# Patient Record
Sex: FEMALE | Race: WHITE | Employment: FULL TIME | URBAN - METROPOLITAN AREA
[De-identification: names, ages, dates, MRNs, and addresses within clinical notes are randomized per-mention and may not be internally consistent; named-entity substitution may affect disease eponyms.]

---

## 2020-02-17 ENCOUNTER — OFFICE VISIT (OUTPATIENT)
Dept: INTERNAL MEDICINE CLINIC | Age: 35
End: 2020-02-17

## 2020-02-17 VITALS
TEMPERATURE: 97.8 F | BODY MASS INDEX: 22.5 KG/M2 | HEIGHT: 66 IN | WEIGHT: 140 LBS | OXYGEN SATURATION: 99 % | RESPIRATION RATE: 18 BRPM | DIASTOLIC BLOOD PRESSURE: 80 MMHG | HEART RATE: 72 BPM | SYSTOLIC BLOOD PRESSURE: 133 MMHG

## 2020-02-17 DIAGNOSIS — E03.9 ACQUIRED HYPOTHYROIDISM: ICD-10-CM

## 2020-02-17 DIAGNOSIS — Z00.8 ENCOUNTER FOR BIOMETRIC SCREENING: Primary | ICD-10-CM

## 2020-02-17 DIAGNOSIS — Z00.00 ENCOUNTER FOR MEDICAL EXAMINATION TO ESTABLISH CARE: ICD-10-CM

## 2020-02-17 RX ORDER — SUMATRIPTAN 100 MG/1
TABLET, FILM COATED ORAL
COMMUNITY
Start: 2020-01-18 | End: 2020-03-18 | Stop reason: SDUPTHER

## 2020-02-17 NOTE — PROGRESS NOTES
Ms. Liseth Duarte is a new patient who is here to establish care. CC:  Establish Care       HPI:    37 yo woman presenting to establish care. Runs, climbing gym and high interval training class. 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)     No kids     Mirena for birth control / no gynecologist     Review of systems:  Constitutional: negative for fever, chills, weight loss, night sweats   Eyes : negative for vision changes, eye pain and discharge  Nose and Throat: negative for tinnitus, sore throat   Cardiovascular: negative for chest pain, palpitations and shortness of breath  Respiratory: negative for shortness of breath, cough and wheezing   Gastroinstestinal: negative for abdominal pain, nausea, vomiting, diarrhea, constipation, and blood in the stool  Musculoskeletal: negative for back ache and joint ache   Genitourinary: negative for dysuria, nocturia, polyuria and hematuria   Neurologic: Negative for focal weakness, numbness or incoordination  Skin: negative for rash, pruritus  Hematologic: negative for easy bruising      Past Medical History:   Diagnosis Date    Beta thalassemia minor     Gilbert syndrome     Thyroid disease         Past Surgical History:   Procedure Laterality Date    HX BREAST AUGMENTATION  5/13/2013    BREAST AUGMENTATION performed by Cruz Barillas MD at [address] HX TONSILLECTOMY         No Known Allergies    Current Outpatient Medications on File Prior to Visit   Medication Sig Dispense Refill    SUMAtriptan (IMITREX) 100 mg tablet       levothyroxine (SYNTHROID) 50 mcg tablet Take 50 mcg by mouth Daily (before breakfast). No current facility-administered medications on file prior to visit.         family history includes Cancer in her father; Diabetes in her mother. Social History     Socioeconomic History    Marital status:      Spouse name: Not on file    Number of children: Not on file    Years of education: Not on file    Highest education level: Not on file   Occupational History    Not on file   Social Needs    Financial resource strain: Not on file    Food insecurity:     Worry: Not on file     Inability: Not on file    Transportation needs:     Medical: Not on file     Non-medical: Not on file   Tobacco Use    Smoking status: Never Smoker    Smokeless tobacco: Never Used   Substance and Sexual Activity    Alcohol use: Yes     Comment: occassionally    Drug use: No    Sexual activity: Yes     Birth control/protection: I.U.D. Lifestyle    Physical activity:     Days per week: Not on file     Minutes per session: Not on file    Stress: Not on file   Relationships    Social connections:     Talks on phone: Not on file     Gets together: Not on file     Attends Restoration service: Not on file     Active member of club or organization: Not on file     Attends meetings of clubs or organizations: Not on file     Relationship status: Not on file    Intimate partner violence:     Fear of current or ex partner: Not on file     Emotionally abused: Not on file     Physically abused: Not on file     Forced sexual activity: Not on file   Other Topics Concern    Not on file   Social History Narrative    Not on file       Visit Vitals  /80 (BP 1 Location: Right arm, BP Patient Position: Sitting)   Pulse 72   Temp 97.8 °F (36.6 °C) (Oral)   Resp 18   Ht 5' 6\" (1.676 m)   Wt 140 lb (63.5 kg)   SpO2 99%   BMI 22.60 kg/m²     General:  Well appearing female no acute distress  HEENT:   PERRL,normal conjunctiva. External ear and canals normal, TMs normal.  Hearing normal to voice. Nose without edema or discharge, normal septum. Lips, teeth, gums normal.  Oropharynx: no erythema, no exudates, no lesions, normal tongue. Neck:  Supple.  Thyroid normal size, nontender, without nodules. No carotid bruit. No masses or lymphadenopathy  Respiratory: no respiratory distress,  no wheezing, no rhonchi, no rales. No chest wall tenderness. Cardiovascular:  RRR, normal S1S2, no murmur. Gastrointestinal: normal bowel sounds, soft, nontender, without masses. No hepatosplenomegaly. Extremities +2 pulses, no edema, normal sensation   Musculoskeletal:  Normal gait. Normal digits and nails. Normal strength and tone, no atrophy, and no abnormal movement. Skin:  No rash, no lesions, no ulcers. Skin warm, normal turgor, without induration or nodules. Neuro:  A and OX4, fluent speech, cranial nerves normal 2-12. Sensation normal to light touch. DTR symmetrical  Psych:  Normal affect            Assessment and Plan:     37 yo woman with a hx of [place] and thalassemia minor and hypothyroidism presenting to establish care    1. Encounter for medical examination to establish care    2. Encounter for biometric screening  - TSH AND FREE T4  - METABOLIC PANEL, COMPREHENSIVE  - CBC WITH AUTOMATED DIFF  - LIPID PANEL  - NICOTINE/COTININE, UR, QT    3. Acquired hypothyroidism  - TSH AND FREE T4  - METABOLIC PANEL, COMPREHENSIVE  - CBC WITH AUTOMATED DIFF    Biometric screen done also   Follow-up and Dispositions    · Return in about 6 months (around 8/17/2020) for hypothyroidism.           Allan Rodriguez MD

## 2020-02-17 NOTE — PATIENT INSTRUCTIONS
[facility] Ros[facility] at [place] calls/patient messages:            Please allow up to 24 hours for someone in the office to contact you about your call or message. Be mindful your provider may be out of the office or your message may require further review. We encourage you to use [employer] for your messages as this is a faster, more efficient way to communicate with our office                         Medication Refills:            Prescription medications require 48-72 business hours to process. We encourage you to use [employer] for your refills. For controlled medications: Please allow 72 business hours to process. Certain medications may require you to  a written prescription at our office. NO narcotic/controlled medications will be prescribed after 4pm Monday through Friday or on weekends              Form/Paperwork Completion:            Please note a $25 fee may incur for all paperwork for completed by our providers. We ask that you allow 7-10 business days. Pre-payment is due prior to picking up/faxing the completed form. You may also download your forms to [employer] to have your doctor print off.

## 2020-02-22 LAB
ALBUMIN SERPL-MCNC: 4.9 G/DL (ref 3.8–4.8)
ALBUMIN/GLOB SERPL: 2.6 {RATIO} (ref 1.2–2.2)
ALP SERPL-CCNC: 37 IU/L (ref 39–117)
ALT SERPL-CCNC: 14 IU/L (ref 0–32)
AST SERPL-CCNC: 17 IU/L (ref 0–40)
BASOPHILS # BLD AUTO: 0.1 X10E3/UL (ref 0–0.2)
BASOPHILS NFR BLD AUTO: 2 %
BILIRUB SERPL-MCNC: 3.6 MG/DL (ref 0–1.2)
BUN SERPL-MCNC: 9 MG/DL (ref 6–20)
BUN/CREAT SERPL: 12 (ref 9–23)
CALCIUM SERPL-MCNC: 9.6 MG/DL (ref 8.7–10.2)
CHLORIDE SERPL-SCNC: 104 MMOL/L (ref 96–106)
CHOLEST SERPL-MCNC: 122 MG/DL (ref 100–199)
CO2 SERPL-SCNC: 22 MMOL/L (ref 20–29)
COTININE UR QL SCN: NEGATIVE NG/ML
CREAT SERPL-MCNC: 0.78 MG/DL (ref 0.57–1)
DRUG SCREEN COMMENT:, 753798: NORMAL
EOSINOPHIL # BLD AUTO: 0.1 X10E3/UL (ref 0–0.4)
EOSINOPHIL NFR BLD AUTO: 2 %
ERYTHROCYTE [DISTWIDTH] IN BLOOD BY AUTOMATED COUNT: 18.4 % (ref 11.7–15.4)
GLOBULIN SER CALC-MCNC: 1.9 G/DL (ref 1.5–4.5)
GLUCOSE SERPL-MCNC: 81 MG/DL (ref 65–99)
HCT VFR BLD AUTO: 33.2 % (ref 34–46.6)
HDLC SERPL-MCNC: 50 MG/DL
HGB BLD-MCNC: 10.3 G/DL (ref 11.1–15.9)
IMM GRANULOCYTES # BLD AUTO: 0 X10E3/UL (ref 0–0.1)
IMM GRANULOCYTES NFR BLD AUTO: 0 %
LDLC SERPL CALC-MCNC: 62 MG/DL (ref 0–99)
LYMPHOCYTES # BLD AUTO: 3.4 X10E3/UL (ref 0.7–3.1)
LYMPHOCYTES NFR BLD AUTO: 53 %
MCH RBC QN AUTO: 20.2 PG (ref 26.6–33)
MCHC RBC AUTO-ENTMCNC: 31 G/DL (ref 31.5–35.7)
MCV RBC AUTO: 65 FL (ref 79–97)
MONOCYTES # BLD AUTO: 0.5 X10E3/UL (ref 0.1–0.9)
MONOCYTES NFR BLD AUTO: 7 %
NEUTROPHILS # BLD AUTO: 2.4 X10E3/UL (ref 1.4–7)
NEUTROPHILS NFR BLD AUTO: 36 %
PLATELET # BLD AUTO: 334 X10E3/UL (ref 150–450)
POTASSIUM SERPL-SCNC: 4.3 MMOL/L (ref 3.5–5.2)
PROT SERPL-MCNC: 6.8 G/DL (ref 6–8.5)
RBC # BLD AUTO: 5.11 X10E6/UL (ref 3.77–5.28)
SODIUM SERPL-SCNC: 140 MMOL/L (ref 134–144)
T4 FREE SERPL-MCNC: 1.63 NG/DL (ref 0.82–1.77)
TRIGL SERPL-MCNC: 48 MG/DL (ref 0–149)
TSH SERPL DL<=0.005 MIU/L-ACNC: 0.94 UIU/ML (ref 0.45–4.5)
VLDLC SERPL CALC-MCNC: 10 MG/DL (ref 5–40)
WBC # BLD AUTO: 6.5 X10E3/UL (ref 3.4–10.8)

## 2020-03-03 NOTE — PROGRESS NOTES
Testing for tobacco is negative  Thyroid function is at goal   Kidney function and liver function test is normal  Mild anemia - hemoglobin is 10.3 consistent with hx of thalassemia minor  Normal cholesterol

## 2020-03-18 RX ORDER — LEVOTHYROXINE SODIUM 50 UG/1
50 TABLET ORAL
Qty: 90 TAB | Refills: 1 | Status: SHIPPED | OUTPATIENT
Start: 2020-03-18 | End: 2020-05-13 | Stop reason: SDUPTHER

## 2020-03-18 RX ORDER — SUMATRIPTAN 100 MG/1
100 TABLET, FILM COATED ORAL
Qty: 90 TAB | Refills: 1 | Status: SHIPPED | OUTPATIENT
Start: 2020-03-18 | End: 2020-03-18

## 2020-03-18 NOTE — TELEPHONE ENCOUNTER
----- Message from Clent Form sent at 3/18/2020  5:51 AM EDT -----  Regarding: Prescription Question  Contact: [phone]  Good morning,  I would like to request a refill of my medications (levothyroxine and sumatriptan). This is the first time that Dr. Mumtaz Lira is prescribing them, so I can't request it through the normal refill process on [facility]. [facility] [address] [facility] in [facility]. Thanks!   Eufemia Venegas

## 2020-03-18 NOTE — TELEPHONE ENCOUNTER
PCP: Narayan Doshi MD    Last appt: 2/17/2020  Future Appointments   Date Time Provider Mili Nunez   8/17/2020  2:45 PM Appa MD Lily Zelaya 88       Requested Prescriptions     Pending Prescriptions Disp Refills    levothyroxine (synthroid) 50 mcg tablet 90 Tab 1     Sig: Take 1 Tab by mouth Daily (before breakfast).  SUMAtriptan (IMITREX) 100 mg tablet 90 Tab 1     Sig: Take 1 Tab by mouth once as needed for Migraine for up to 1 dose.

## 2020-04-07 ENCOUNTER — E-VISIT (OUTPATIENT)
Dept: INTERNAL MEDICINE CLINIC | Age: 35
End: 2020-04-07

## 2020-04-16 ENCOUNTER — VIRTUAL VISIT (OUTPATIENT)
Dept: INTERNAL MEDICINE CLINIC | Age: 35
End: 2020-04-16

## 2020-04-16 DIAGNOSIS — L21.9 SEBORRHEIC DERMATITIS OF SCALP: Primary | ICD-10-CM

## 2020-04-16 RX ORDER — SUMATRIPTAN 100 MG/1
TABLET, FILM COATED ORAL
COMMUNITY
Start: 2020-03-21 | End: 2020-05-13 | Stop reason: SDUPTHER

## 2020-04-16 RX ORDER — KETOCONAZOLE 20 MG/ML
10 SHAMPOO TOPICAL
Qty: 1 BOTTLE | Refills: 0 | Status: SHIPPED | OUTPATIENT
Start: 2020-04-17 | End: 2020-04-25

## 2020-04-16 NOTE — PROGRESS NOTES
Reviewed record in preparation for visit and have obtained necessary documentation. Identified pt with two pt identifiers(name and ). Chief Complaint   Patient presents with    Rash       Health Maintenance Due   Topic Date Due    Pap Test  2007    DTaP/Tdap/Td  (2 - Td) 05/15/2019       Ms. Radha Pelletier has a reminder for a \"due or due soon\" health maintenance. I have asked that she discuss this further with her primary care provider for follow-up on this health maintenance. Coordination of Care Questionnaire:  :     1) Have you been to an emergency room, urgent care clinic since your last visit? no   Hospitalized since your last visit? no             2) Have you seen or consulted any other health care providers outside of [address] [place] [address] since your last visit? no  (Include any pap smears or colon screenings in this section.)    3) In the event something were to happen to you and you were unable to speak on your behalf, do you have an Advance Directive/ Living Will in place stating your wishes? NO    Do you have an Advance Directive on file? no    4) Are you interested in receiving information on Advance Directives? NO    Patient is accompanied by self I have received verbal consent from Romana Torres to discuss any/all medical information while they are present in the room.

## 2020-04-16 NOTE — PROGRESS NOTES
CC: Rash      HPI:      35 yo woman with a hx of [place] and thalassemia minor and hypothyroidism presenting to discuss rash on scalp   Onset  Few weeks ago  It causes some itching   At times notes flakes  No lice seen  No redness  No  Hair loss  No fever or chills     This is an established visit conducted via telemedicine with video. The patient has been instructed that this meets HIPAA criteria and acknowledges and agrees to this method of visitation. Pursuant to the emergency declaration under the [address] [place] [address] [facility] authority and the [employer] Act, this Virtual Visit was conducted, with patient's consent, to reduce the patient's risk of exposure to COVID-19 and provide continuity of care for an established patient. Services were provided through a video synchronous discussion virtually to substitute for in-person clinic visit. ROS:  Constitutional: negative for fevers, chills, anorexia and weight loss  Eyes:   negative for visual disturbance,  irritation  ENT:   negative for tinnitus,sore throat,nasal congestion,ear pain, sinus pain. Respiratory:  negative for cough, hemoptysis, dyspnea,wheezing  CV:   negative for chest pain, palpitations, lower extremity edema  GI:   negative for nausea, vomiting, diarrhea, abdominal pain,melena  Genitourinary: negative for frequency, dysuria, hematuria  Musculoskel: negative for myalgias, arthralgias, back pain, muscle weakness, joint pain  Neurological:  negative for headaches, dizziness, focal weakness, numbness  Psych:             Negative for depression and anxiety    Past Medical History:   Diagnosis Date    Beta thalassemia minor     Gilbert syndrome     Thyroid disease        Current Outpatient Medications on File Prior to Visit   Medication Sig Dispense Refill    levothyroxine (synthroid) 50 mcg tablet Take 1 Tab by mouth Daily (before breakfast).  80 Tab 1    SUMAtriptan (IMITREX) 100 mg tablet        No current facility-administered medications on file prior to visit. Past Surgical History:   Procedure Laterality Date    HX BREAST AUGMENTATION  5/13/2013    BREAST [facility]UG[facility]    HX TONSILLECTOMY         Family History   Problem Relation Age of Onset    Diabetes Mother     Cancer Father      Reviewed and no changes     Social History     Socioeconomic History    Marital status:      Spouse name: Not on file    Number of children: Not on file    Years of education: Not on file    Highest education level: Not on file   Occupational History    Not on file   Social Needs    Financial resource strain: Not on file    Food insecurity     Worry: Not on file     Inability: Not on file    Transportation needs     Medical: Not on file     Non-medical: Not on file   Tobacco Use    Smoking status: Never Smoker    Smokeless tobacco: Never Used   Substance and Sexual Activity    Alcohol use: Yes     Comment: occassionally    Drug use: No    Sexual activity: Yes     Birth control/protection: I.U.D. Lifestyle    Physical activity     Days per week: Not on file     Minutes per session: Not on file    Stress: Not on file   Relationships    Social connections     Talks on phone: Not on file     Gets together: Not on file     Attends Episcopal service: Not on file     Active member of club or organization: Not on file     Attends meetings of clubs or organizations: Not on file     Relationship status: Not on file    Intimate partner violence     Fear of current or ex partner: Not on file     Emotionally abused: Not on file     Physically abused: Not on file     Forced sexual activity: Not on file   Other Topics Concern    Not on file   Social History Narrative    Not on file          There were no vitals taken for this visit.     Physical Examination:   Gen: well appearing female  HEENT: normal conjunctiva,   Head with assistance of  visualized scalp there is an area with flakiness with a yellow white base ( around 2 cm)   Neck: patient does not feel enlarged or tender LAD or masses  Resp: normal respiratory effort, no audible wheezing. CV: patient does not feel palpitations or heart irregularity  Abd: patient does not feel abdominal tenderness or mass, patient does not notice distension  Extrem: patient does not see swelling in ankles or joints. Neuro: Alert and oriented, able to answer questions without difficulty, able to move all extremities and walk normally          Lab Results   Component Value Date/Time    WBC 6.5 02/21/2020 08:59 AM    HGB 10.3 (L) 02/21/2020 08:59 AM    HCT 33.2 (L) 02/21/2020 08:59 AM    PLATELET 674 17/38/4068 08:59 AM    MCV 65 (L) 02/21/2020 08:59 AM     Lab Results   Component Value Date/Time    Sodium 140 02/21/2020 08:59 AM    Potassium 4.3 02/21/2020 08:59 AM    Chloride 104 02/21/2020 08:59 AM    CO2 22 02/21/2020 08:59 AM    Glucose 81 02/21/2020 08:59 AM    BUN 9 02/21/2020 08:59 AM    Creatinine 0.78 02/21/2020 08:59 AM    BUN/Creatinine ratio 12 02/21/2020 08:59 AM    GFR est  02/21/2020 08:59 AM    GFR est non- 02/21/2020 08:59 AM    Calcium 9.6 02/21/2020 08:59 AM     Lab Results   Component Value Date/Time    Cholesterol, total 122 02/21/2020 08:59 AM    HDL Cholesterol 50 02/21/2020 08:59 AM    LDL, calculated 62 02/21/2020 08:59 AM    VLDL, calculated 10 02/21/2020 08:59 AM    Triglyceride 48 02/21/2020 08:59 AM     Lab Results   Component Value Date/Time    TSH 0.940 02/21/2020 08:59 AM     No results found for: PSA, PSA2, PSAR1, PSA1, PSAR2, PSA3, PSAR3, [id number], [id number], PSALT  No results found for: HBA1C, HGBE8, QTJ3ERRI, CXV5WVGV, VZS3RSFB  No results found for: Cheryln Stalls, VD3RIA    Lab Results   Component Value Date/Time    ALT (SGPT) 14 02/21/2020 08:59 AM    AST (SGOT) 17 02/21/2020 08:59 AM    Alk. phosphatase 37 (L) 02/21/2020 08:59 AM    Bilirubin, total 3.6 (H) 02/21/2020 08:59 AM           Assessment/Plan:    1. Seborrheic dermatitis of scalp  Use shampoo twice a week for 3-4 weeks, leave on for 3 minutes prior to rinsing  Call if symptoms do not improve  - ketoconazole (NIZORAL) 2 % shampoo; Apply 10 mL to affected area every Monday and Friday for 8 days. Dispense: 1 Bottle; Refill: Jessica Loera MD    This is an established visit conducted via real time video and audio telemedicine. The patient has been instructed that this meets HIPAA criteria and acknowledges and agrees to this method of visitation.

## 2020-05-13 RX ORDER — LEVOTHYROXINE SODIUM 50 UG/1
50 TABLET ORAL
Qty: 90 TAB | Refills: 1 | Status: SHIPPED | OUTPATIENT
Start: 2020-05-13 | End: 2020-12-22 | Stop reason: SDUPTHER

## 2020-05-13 RX ORDER — SUMATRIPTAN 100 MG/1
100 TABLET, FILM COATED ORAL
Qty: 9 TAB | Refills: 1 | Status: SHIPPED | OUTPATIENT
Start: 2020-05-13 | End: 2020-05-13

## 2020-05-13 NOTE — TELEPHONE ENCOUNTER
Patient is requesting a 90 day supply of the following pended medication(s) to be sent to [address] [place] [address]. It is now the patient's preferred pharmacy for all maintenance medications due to cost effectiveness.      [address] [facility] [address] [facility]  Ant Castro, [facility]  Phone: [phone]

## 2020-08-17 ENCOUNTER — VIRTUAL VISIT (OUTPATIENT)
Dept: INTERNAL MEDICINE CLINIC | Age: 35
End: 2020-08-17
Payer: COMMERCIAL

## 2020-08-17 DIAGNOSIS — G43.909 MIGRAINE WITHOUT STATUS MIGRAINOSUS, NOT INTRACTABLE, UNSPECIFIED MIGRAINE TYPE: ICD-10-CM

## 2020-08-17 DIAGNOSIS — E03.9 ACQUIRED HYPOTHYROIDISM: Primary | ICD-10-CM

## 2020-08-17 PROCEDURE — 99213 OFFICE O/P EST LOW 20 MIN: CPT | Performed by: INTERNAL MEDICINE

## 2020-08-17 RX ORDER — SUMATRIPTAN 100 MG/1
TABLET, FILM COATED ORAL
COMMUNITY
Start: 2020-08-04 | End: 2021-06-02

## 2020-08-17 NOTE — PROGRESS NOTES
Reviewed record in preparation for visit and have obtained necessary documentation. Identified pt with two pt identifiers(name and ). Chief Complaint   Patient presents with    Hypothyroidism       Health Maintenance Due   Topic Date Due    Pap Test  2007    DTaP/Tdap/Td  (2 - Td) 05/15/2019    Flu Vaccine  2020       Ms. Luba Hashimoto has a reminder for a \"due or due soon\" health maintenance. I have asked that she discuss this further with her primary care provider for follow-up on this health maintenance. Coordination of Care Questionnaire:  :     1) Have you been to an emergency room, urgent care clinic since your last visit? no   Hospitalized since your last visit? no             2) Have you seen or consulted any other health care providers outside of [facility] since your last visit? no  (Include any pap smears or colon screenings in this section.)    3) In the event something were to happen to you and you were unable to speak on your behalf, do you have an Advance Directive/ Living Will in place stating your wishes? NO    Do you have an Advance Directive on file? no    4) Are you interested in receiving information on Advance Directives? NO    Patient is accompanied by self I have received verbal consent from Sunny Arias to discuss any/all medical information while they are present in the room.

## 2020-08-17 NOTE — PROGRESS NOTES
CC: Hypothyroidism      HPI:    She is a 36 y.o. female who presents for evaluation of       35 yo woman with a hx of [place] and thalassemia minor and hypothyroidism presenting to follow up       Hypothyroidism - on levothyroxine 50 mcg daily / denies hair loss brittle nails   Energy level is good    Migraines - varies in intensity, imitrex works well as abortive therapy     This is an established visit conducted via telemedicine with video. The patient has been instructed that this meets HIPAA criteria and acknowledges and agrees to this method of visitation. Pursuant to the emergency declaration under the [facility], [facility] waiver authority and the [employer] Act, this Virtual Visit was conducted, with patient's consent, to reduce the patient's risk of exposure to COVID-19 and provide continuity of care for an established patient. Services were provided through a video synchronous discussion virtually to substitute for in-person clinic visit. ROS:  Constitutional: negative for fevers, chills, anorexia and weight loss  Eyes:   negative for visual disturbance,  irritation  ENT:   negative for tinnitus,sore throat,nasal congestion,ear pain, sinus pain.    Respiratory:  negative for cough, hemoptysis, dyspnea,wheezing  CV:   negative for chest pain, palpitations, lower extremity edema  GI:   negative for nausea, vomiting, diarrhea, abdominal pain,melena  Genitourinary: negative for frequency, dysuria, hematuria  Musculoskel: negative for myalgias, arthralgias, back pain, muscle weakness, joint pain  Neurological:  negative for headaches, dizziness, focal weakness, numbness  Psych:             Negative for depression and anxiety    Past Medical History:   Diagnosis Date    Beta thalassemia minor     Gilbert syndrome     Thyroid disease        Current Outpatient Medications on File Prior to Visit   Medication Sig Dispense Refill    levothyroxine (synthroid) 50 mcg tablet Take 1 Tab by mouth Daily (before breakfast). 90 Tab 1    SUMAtriptan (IMITREX) 100 mg tablet        No current facility-administered medications on file prior to visit. Past Surgical History:   Procedure Laterality Date    HX BREAST AUGMENTATION  5/13/2013    BREAST AUGMENTATION performed by Jobie Riedel, MD at [facility]    HX TONSILLECTOMY         Family History   Problem Relation Age of Onset    Diabetes Mother     Cancer Father      Reviewed and no changes     Social History     Socioeconomic History    Marital status:      Spouse name: Not on file    Number of children: Not on file    Years of education: Not on file    Highest education level: Not on file   Occupational History    Not on file   Social Needs    Financial resource strain: Not on file    Food insecurity     Worry: Not on file     Inability: Not on file    Transportation needs     Medical: Not on file     Non-medical: Not on file   Tobacco Use    Smoking status: Never Smoker    Smokeless tobacco: Never Used   Substance and Sexual Activity    Alcohol use: Yes     Comment: occassionally    Drug use: No    Sexual activity: Yes     Birth control/protection: I.U.D.    Lifestyle    Physical activity     Days per week: Not on file     Minutes per session: Not on file    Stress: Not on file   Relationships    Social connections     Talks on phone: Not on file     Gets together: Not on file     Attends Synagogue service: Not on file     Active member of club or organization: Not on file     Attends meetings of clubs or organizations: Not on file     Relationship status: Not on file    Intimate partner violence     Fear of current or ex partner: Not on file     Emotionally abused: Not on file     Physically abused: Not on file     Forced sexual activity: Not on file   Other Topics Concern    Not on file   Social History Narrative    Not on file There were no vitals taken for this visit. Physical Examination:   Gen: well appearing female  HEENT: normal conjunctiva, no audible congestion, patient does not see oral erythema, has MMM  Neck: patient does not feel enlarged or tender LAD or masses  Resp: normal respiratory effort, no audible wheezing. CV: patient does not feel palpitations or heart irregularity  Abd: patient does not feel abdominal tenderness or mass, patient does not notice distension  Extrem: patient does not see swelling in ankles or joints.    Neuro: Alert and oriented, able to answer questions without difficulty, able to move all extremities and walk normally          Lab Results   Component Value Date/Time    WBC 6.5 02/21/2020 08:59 AM    HGB 10.3 (L) 02/21/2020 08:59 AM    HCT 33.2 (L) 02/21/2020 08:59 AM    PLATELET 686 23/95/7609 08:59 AM    MCV 65 (L) 02/21/2020 08:59 AM     Lab Results   Component Value Date/Time    Sodium 140 02/21/2020 08:59 AM    Potassium 4.3 02/21/2020 08:59 AM    Chloride 104 02/21/2020 08:59 AM    CO2 22 02/21/2020 08:59 AM    Glucose 81 02/21/2020 08:59 AM    BUN 9 02/21/2020 08:59 AM    Creatinine 0.78 02/21/2020 08:59 AM    BUN/Creatinine ratio 12 02/21/2020 08:59 AM    GFR est  02/21/2020 08:59 AM    GFR est non- 02/21/2020 08:59 AM    Calcium 9.6 02/21/2020 08:59 AM     Lab Results   Component Value Date/Time    Cholesterol, total 122 02/21/2020 08:59 AM    HDL Cholesterol 50 02/21/2020 08:59 AM    LDL, calculated 62 02/21/2020 08:59 AM    VLDL, calculated 10 02/21/2020 08:59 AM    Triglyceride 48 02/21/2020 08:59 AM     Lab Results   Component Value Date/Time    TSH 0.940 02/21/2020 08:59 AM     No results found for: PSA, PSA2, PSAR1, PSA1, PSAR2, PSA3, PSAR3, [id number], [id number], PSALT  No results found for: HBA1C, HGBE8, IAW9YELS, GQO9EOWZ, FDS7HNIF  No results found for: Art Elisha, VD3RIA    Lab Results   Component Value Date/Time    ALT (SGPT) 14 02/21/2020 08:59 AM    Alk. phosphatase 37 (L) 02/21/2020 08:59 AM    Bilirubin, total 3.6 (H) 02/21/2020 08:59 AM           Assessment/Plan:    1. Acquired hypothyroidism  - euthyroid on exam- on levothyroxine 50 mcg daily   - TSH AND FREE T4    2. Migraine without status migrainosus, not intractable, unspecified migraine type  imitrex works as as abortive therapy       3. Thalassemia minor: stable mild anemia      Follow-up and Dispositions    · Return in about 6 months (around 2/17/2021) for thyroid . Kristin Hodges MD    This is an established visit conducted via real time video and audio telemedicine. The patient has been instructed that this meets HIPAA criteria and acknowledges and agrees to this method of visitation.

## 2020-09-04 LAB
T4 FREE SERPL-MCNC: 1.32 NG/DL (ref 0.82–1.77)
TSH SERPL DL<=0.005 MIU/L-ACNC: 0.79 UIU/ML (ref 0.45–4.5)

## 2020-12-22 RX ORDER — LEVOTHYROXINE SODIUM 50 UG/1
50 TABLET ORAL
Qty: 90 TAB | Refills: 1 | Status: SHIPPED | OUTPATIENT
Start: 2020-12-22 | End: 2021-06-21

## 2021-06-02 RX ORDER — SUMATRIPTAN 100 MG/1
TABLET, FILM COATED ORAL
Qty: 9 TABLET | Refills: 1 | Status: SHIPPED | OUTPATIENT
Start: 2021-06-02 | End: 2021-06-03

## 2021-06-03 ENCOUNTER — TELEPHONE (OUTPATIENT)
Dept: PHARMACY | Age: 36
End: 2021-06-03

## 2021-06-03 RX ORDER — SUMATRIPTAN 100 MG/1
TABLET, FILM COATED ORAL
Qty: 9 TABLET | Refills: 1 | Status: SHIPPED | OUTPATIENT
Start: 2021-06-03 | End: 2021-06-04 | Stop reason: SDUPTHER

## 2021-06-03 NOTE — TELEPHONE ENCOUNTER
We have been requesting a refill for the Sumatriptan tablets. It looks to be approved but the script shows printed. I have not received any fax. Can you please resend electronically or fax to [phone]? You can also call it in at [phone]  Please call asap with any questions as well.     Thank you,  [address] Delivery

## 2021-06-04 RX ORDER — SUMATRIPTAN 100 MG/1
TABLET, FILM COATED ORAL
Qty: 9 TABLET | Refills: 1 | Status: SHIPPED | OUTPATIENT
Start: 2021-06-04 | End: 2021-06-07 | Stop reason: SDUPTHER

## 2021-06-07 RX ORDER — SUMATRIPTAN 100 MG/1
TABLET, FILM COATED ORAL
Qty: 9 TABLET | Refills: 1 | Status: SHIPPED | OUTPATIENT
Start: 2021-06-07 | End: 2021-07-22 | Stop reason: ALTCHOICE

## 2021-06-07 NOTE — TELEPHONE ENCOUNTER
[facility] Delivery called and has not gotten the script from 6-4-21 that looks like it went, but looking further it did not go through. This is for the Sumatriptan     Can this be sent to them as soon as possible? Thanks.

## 2021-06-21 ENCOUNTER — OFFICE VISIT (OUTPATIENT)
Dept: SURGERY | Age: 36
End: 2021-06-21
Payer: COMMERCIAL

## 2021-06-21 VITALS
DIASTOLIC BLOOD PRESSURE: 82 MMHG | BODY MASS INDEX: 22.18 KG/M2 | WEIGHT: 138 LBS | HEIGHT: 66 IN | RESPIRATION RATE: 16 BRPM | TEMPERATURE: 99.2 F | HEART RATE: 67 BPM | OXYGEN SATURATION: 99 % | SYSTOLIC BLOOD PRESSURE: 120 MMHG

## 2021-06-21 DIAGNOSIS — C43.4 MELANOMA OF NECK (HCC): Primary | ICD-10-CM

## 2021-06-21 PROCEDURE — 99202 OFFICE O/P NEW SF 15 MIN: CPT | Performed by: SURGERY

## 2021-06-21 NOTE — LETTER
6/21/2021 Patient: Vane Ny YOB: 1985 Date of Visit: 6/21/2021 Susan Reis MD 
[address] [facility] [address] [facility] [address] [address] In [employer] Lajean Goodell, MD 
[address] [facility] [address] [facility] [address] Via Fax: [phone] Dear Kandyce Ribas, MD Lajean Goodell, MD, Thank you for referring Ms. Vane Ny to [facility] [facility] for evaluation. My notes for this consultation are attached. If you have questions, please do not hesitate to call me. I look forward to following your patient along with you. Sincerely, Katja Veliz MD

## 2021-06-21 NOTE — H&P (VIEW-ONLY)
Subjective:      Caren Cloud  is a 30 y.o.  female referred by Dr. Aneesh Gray for evaluation of LEFT lateral neck base melanoma. Pt had shave biopsy of LEFT lateral neck base with Dr. Aneesh Gray on 05/26/21 with PATH demonstrating malignant melanoma, at least 1.94 mm thick, lesion extends to biopsy base, pT2a. Pt reports she had mole present on her LEFT neck for several years. Several months ago, she noticed area would bleed and scab over. Pt reports she is currently 4 weeks pregnant. Past Medical History:   Diagnosis Date    Beta thalassemia minor     Gilbert syndrome     Melanoma of neck ([facility] [address] 6/21/2021    Thyroid disease        Past Surgical History:   Procedure Laterality Date    HX BREAST AUGMENTATION  5/13/2013    BREAST AUGMENTATION performed by Alma Rosa Lang MD at [place] [address] HX TONSILLECTOMY         Social History     Tobacco Use    Smoking status: Never Smoker    Smokeless tobacco: Never Used   Substance Use Topics    Alcohol use: Yes     Comment: occassionally       Family History   Problem Relation Age of Onset    Diabetes Mother     Cancer Father        Current Outpatient Medications on File Prior to Visit   Medication Sig Dispense Refill    prenatal vit-iron fumarate-fa 27 mg iron- 0.8 mg tab tablet Take 1 Tablet by mouth daily.  SUMAtriptan (IMITREX) 100 mg tablet TAKE 1 TABLET BY MOUTH ONCE AS NEEDED FOR MIGRAINE FOR UP TO ONE DOSE 9 Tablet 1     No current facility-administered medications on file prior to visit.        No Known Allergies    Review of Systems:  Constitutional: No fever or chills  Neurologic: No headache  Eyes: No scleral icterus or irritated eyes  Nose: No nasal pain or drainage  Mouth: No oral lesions or sore throat  Cardiac: No palpations or chest pain  Pulmonary: No cough or shortness or breath  Gastrointestinal: No nausea, emesis, diarrhea, or constipation  Genitourinary: No dysuria  Musculoskeletal: No muscle or joint tenderness  Skin: No rashes or lesions  Psychiatric: No anxiety or depressed mood    Objective:     Visit Vitals  /82 (BP 1 Location: Left upper arm, BP Patient Position: Sitting, BP Cuff Size: Adult)   Pulse 67   Temp 99.2 °F (37.3 °C) (Oral)   Resp 16   Ht 5' 6\" (1.676 m)   Wt 138 lb (62.6 kg)   SpO2 99%   BMI 22.27 kg/m²        Physical Exam:  General: No acute distress, conversant  Eyes: PERRLA, no scleral icterus  HENT: Normocephalic without oral lesions  Neck: Trachea midline without LAD  Cardiac: Normal pulse rate and rhythm  Pulmonary: Symmetric chest rise with normal effort  GI: Soft, NT, ND, no hernia, no splenomegaly  Skin: LEFT lower neck (zone of level 5) shave biopsy. Lymphatic: No cervical nor supraclavicular adenopathy. Extremities: No edema or joint stiffness  Psych: Appropriate mood and affect    Labs: No results found for this or any previous visit (from the past 24 hour(s)). Data Review: All previous imaging, testing and lab work was reviewed and interpreted. Assessment and Plan:       ICD-10-CM ICD-9-CM    1. Melanoma of neck (HCC)  C43.4 172.4        Will plan for wide excision LEFT lower neck melanoma and SLNBx. Reviewed the details of this procedure and what pt should expect for recovery. This will be an outpatient procedure and pt will need a  to take them home following surgery. All questions were answered and pt is in agreement with this plan. The patient was counseled at length about the risks of hilton Covid-19 during their perioperative period and any recovery window from their procedure. The patient was made aware that hilton Covid-19  may worsen their prognosis for recovering from their procedure and lend to a higher morbidity and/or mortality risk. All material risks, benefits, and reasonable alternatives including postponing the procedure were discussed. The patient does  wish to proceed with the procedure at this time.     Total face to face time with patient: 15 minutes. Greater than 50% of the time was spent in counseling. This document was scribed by Loraine Dominguez as dictated by Dr. Myriam Powell.      Signed By: Kameron Gonzales MD     06/21/21

## 2021-06-21 NOTE — PROGRESS NOTES
1. Have you been to the ER, urgent care clinic since your last visit? Hospitalized since your last visit? No    2. Have you seen or consulted any other health care providers outside of the [address] [place] [address] since your last visit? Include any pap smears or colon screening.  No

## 2021-06-21 NOTE — PROGRESS NOTES
Subjective:      Domenico Mckeon  is a 30 y.o.  female referred by Dr. Lee Hernandes for evaluation of LEFT lateral neck base melanoma. Pt had shave biopsy of LEFT lateral neck base with Dr. Lee Hernandes on 05/26/21 with PATH demonstrating malignant melanoma, at least 1.94 mm thick, lesion extends to biopsy base, pT2a. Pt reports she had mole present on her LEFT neck for several years. Several months ago, she noticed area would bleed and scab over. Pt reports she is currently 4 weeks pregnant. Past Medical History:   Diagnosis Date    Beta thalassemia minor     Gilbert syndrome     Melanoma of neck ([facility] [address] 6/21/2021    Thyroid disease        Past Surgical History:   Procedure Laterality Date    HX BREAST AUGMENTATION  5/13/2013    BREAST AUGMENTATION performed by Gerard Denis MD at [address] HX TONSILLECTOMY         Social History     Tobacco Use    Smoking status: Never Smoker    Smokeless tobacco: Never Used   Substance Use Topics    Alcohol use: Yes     Comment: occassionally       Family History   Problem Relation Age of Onset    Diabetes Mother     Cancer Father        Current Outpatient Medications on File Prior to Visit   Medication Sig Dispense Refill    prenatal vit-iron fumarate-fa 27 mg iron- 0.8 mg tab tablet Take 1 Tablet by mouth daily.  SUMAtriptan (IMITREX) 100 mg tablet TAKE 1 TABLET BY MOUTH ONCE AS NEEDED FOR MIGRAINE FOR UP TO ONE DOSE 9 Tablet 1     No current facility-administered medications on file prior to visit.        No Known Allergies    Review of Systems:  Constitutional: No fever or chills  Neurologic: No headache  Eyes: No scleral icterus or irritated eyes  Nose: No nasal pain or drainage  Mouth: No oral lesions or sore throat  Cardiac: No palpations or chest pain  Pulmonary: No cough or shortness or breath  Gastrointestinal: No nausea, emesis, diarrhea, or constipation  Genitourinary: No dysuria  Musculoskeletal: No muscle or joint tenderness  Skin: No rashes or lesions  Psychiatric: No anxiety or depressed mood    Objective:     Visit Vitals  /82 (BP 1 Location: Left upper arm, BP Patient Position: Sitting, BP Cuff Size: Adult)   Pulse 67   Temp 99.2 °F (37.3 °C) (Oral)   Resp 16   Ht 5' 6\" (1.676 m)   Wt 138 lb (62.6 kg)   SpO2 99%   BMI 22.27 kg/m²        Physical Exam:  General: No acute distress, conversant  Eyes: PERRLA, no scleral icterus  HENT: Normocephalic without oral lesions  Neck: Trachea midline without LAD  Cardiac: Normal pulse rate and rhythm  Pulmonary: Symmetric chest rise with normal effort  GI: Soft, NT, ND, no hernia, no splenomegaly  Skin: LEFT lower neck (zone of level 5) shave biopsy. Lymphatic: No cervical nor supraclavicular adenopathy. Extremities: No edema or joint stiffness  Psych: Appropriate mood and affect    Labs: No results found for this or any previous visit (from the past 24 hour(s)). Data Review: All previous imaging, testing and lab work was reviewed and interpreted. Assessment and Plan:       ICD-10-CM ICD-9-CM    1. Melanoma of neck (HCC)  C43.4 172.4        Will plan for wide excision LEFT lower neck melanoma and SLNBx. Reviewed the details of this procedure and what pt should expect for recovery. This will be an outpatient procedure and pt will need a  to take them home following surgery. All questions were answered and pt is in agreement with this plan. The patient was counseled at length about the risks of hilton Covid-19 during their perioperative period and any recovery window from their procedure. The patient was made aware that hilton Covid-19  may worsen their prognosis for recovering from their procedure and lend to a higher morbidity and/or mortality risk. All material risks, benefits, and reasonable alternatives including postponing the procedure were discussed. The patient does  wish to proceed with the procedure at this time.     Total face to face time with patient: 15 minutes. Greater than 50% of the time was spent in counseling. This document was scribed by Marylee Bence as dictated by Dr. Adelaida Lay.      Signed By: Ayaka Qiu MD     06/21/21

## 2021-06-24 ENCOUNTER — TRANSCRIBE ORDER (OUTPATIENT)
Dept: SCHEDULING | Age: 36
End: 2021-06-24

## 2021-06-24 DIAGNOSIS — C43.9 MELANOMA (HCC): Primary | ICD-10-CM

## 2021-06-24 DIAGNOSIS — C43.4 MELANOMA OF NECK (HCC): Primary | ICD-10-CM

## 2021-06-24 NOTE — PERIOP NOTES
CALLED TO SCHEDULE COVID TESTING APPT. PT IS FULLY VACCINATED. PFIZER 12/23/2020 & 1/13/2021. PT ADVISED TO BRING VACCINATION CARD DAY OF SURGERY.

## 2021-06-28 ENCOUNTER — TELEPHONE (OUTPATIENT)
Dept: SURGERY | Age: 36
End: 2021-06-28

## 2021-06-28 ENCOUNTER — ANESTHESIA EVENT (OUTPATIENT)
Dept: SURGERY | Age: 36
End: 2021-06-28
Payer: COMMERCIAL

## 2021-06-28 NOTE — PERIOP NOTES
Preop phone call completed. Preop instructions and preop medications reveiwed with patient. Pt instructed to Purchase 2  4 oz bottles of CHG soap and instructed in use. MD NOTIFIED THAT PATIENT IS NOW PREGNANT.

## 2021-06-28 NOTE — TELEPHONE ENCOUNTER
Told her I had communicated with Dr. Nancy South who said to go ahead, since she is a very healthy young woman. Will proceed tomorrow.

## 2021-06-28 NOTE — TELEPHONE ENCOUNTER
Dr. Arron Martin, patient interested in free prenatal vitamins and iron per response from [id number] [facility]. Order for [facility] Baby Box pending for your convenience. Thank you,  Estephania Jo. Anibal, PharmD  [address]  Department, toll free: [phone], option [address].      =======================================================================    [place] REVIEW - Be Well With Baby    SUBJECTIVE  Kenneth Box is a 30 y.o. female currently identified as interested in receiving a [facility] \"Baby Box\" containing a 1 year supply of prenatal vitamins from [facility]. Contents of Baby Box:   Welcome Letter   6 month supply of [employer]'s Blend Prenatal Multivitamin with Minerals (Take 1 softgel once daily) with 1 refill    Thank you  Rosita Barnett, PharmD  [address]  Department, toll free: [phone], option [address].

## 2021-06-29 ENCOUNTER — APPOINTMENT (OUTPATIENT)
Dept: NUCLEAR MEDICINE | Age: 36
End: 2021-06-29
Attending: SURGERY
Payer: COMMERCIAL

## 2021-06-29 ENCOUNTER — HOSPITAL ENCOUNTER (OUTPATIENT)
Age: 36
Setting detail: OUTPATIENT SURGERY
Discharge: HOME | End: 2021-06-29
Attending: SURGERY | Admitting: SURGERY
Payer: COMMERCIAL

## 2021-06-29 ENCOUNTER — ANESTHESIA (OUTPATIENT)
Dept: SURGERY | Age: 36
End: 2021-06-29
Payer: COMMERCIAL

## 2021-06-29 VITALS
HEIGHT: 66 IN | OXYGEN SATURATION: 100 % | TEMPERATURE: 98.1 F | HEART RATE: 78 BPM | SYSTOLIC BLOOD PRESSURE: 103 MMHG | WEIGHT: 138.01 LBS | BODY MASS INDEX: 22.18 KG/M2 | RESPIRATION RATE: 21 BRPM | DIASTOLIC BLOOD PRESSURE: 61 MMHG

## 2021-06-29 DIAGNOSIS — C43.9 MELANOMA (HCC): ICD-10-CM

## 2021-06-29 DIAGNOSIS — C43.4 MELANOMA OF NECK (HCC): ICD-10-CM

## 2021-06-29 PROCEDURE — 77030002933 HC SUT MCRYL J&J -A: Performed by: SURGERY

## 2021-06-29 PROCEDURE — 74011250636 HC RX REV CODE- 250/636: Performed by: ANESTHESIOLOGY

## 2021-06-29 PROCEDURE — 77030002996 HC SUT SLK J&J -A: Performed by: SURGERY

## 2021-06-29 PROCEDURE — A9520 TC99 TILMANOCEPT DIAG 0.5MCI: HCPCS

## 2021-06-29 PROCEDURE — 12042 INTMD RPR N-HF/GENIT2.6-7.5: CPT | Performed by: SURGERY

## 2021-06-29 PROCEDURE — 74011250636 HC RX REV CODE- 250/636: Performed by: NURSE ANESTHETIST, CERTIFIED REGISTERED

## 2021-06-29 PROCEDURE — 77030031139 HC SUT VCRL2 J&J -A: Performed by: SURGERY

## 2021-06-29 PROCEDURE — 2709999900 HC NON-CHARGEABLE SUPPLY: Performed by: SURGERY

## 2021-06-29 PROCEDURE — 74011000250 HC RX REV CODE- 250: Performed by: SURGERY

## 2021-06-29 PROCEDURE — 76010000138 HC OR TIME 0.5 TO 1 HR: Performed by: SURGERY

## 2021-06-29 PROCEDURE — 74011250637 HC RX REV CODE- 250/637: Performed by: ANESTHESIOLOGY

## 2021-06-29 PROCEDURE — 88305 TISSUE EXAM BY PATHOLOGIST: CPT

## 2021-06-29 PROCEDURE — 76210000020 HC REC RM PH II FIRST 0.5 HR: Performed by: SURGERY

## 2021-06-29 PROCEDURE — 74011250636 HC RX REV CODE- 250/636: Performed by: SURGERY

## 2021-06-29 PROCEDURE — 77030010507 HC ADH SKN DERMBND J&J -B: Performed by: SURGERY

## 2021-06-29 PROCEDURE — 76060000032 HC ANESTHESIA 0.5 TO 1 HR: Performed by: SURGERY

## 2021-06-29 PROCEDURE — 77030040361 HC SLV COMPR DVT MDII -B: Performed by: SURGERY

## 2021-06-29 PROCEDURE — 74011000250 HC RX REV CODE- 250: Performed by: NURSE ANESTHETIST, CERTIFIED REGISTERED

## 2021-06-29 PROCEDURE — 76210000006 HC OR PH I REC 0.5 TO 1 HR: Performed by: SURGERY

## 2021-06-29 PROCEDURE — 11626 EXC S/N/H/F/G MAL+MRG >4 CM: CPT | Performed by: SURGERY

## 2021-06-29 RX ORDER — LIDOCAINE HYDROCHLORIDE 20 MG/ML
INJECTION, SOLUTION EPIDURAL; INFILTRATION; INTRACAUDAL; PERINEURAL AS NEEDED
Status: DISCONTINUED | OUTPATIENT
Start: 2021-06-29 | End: 2021-06-29 | Stop reason: HOSPADM

## 2021-06-29 RX ORDER — MORPHINE SULFATE 2 MG/ML
2 INJECTION, SOLUTION INTRAMUSCULAR; INTRAVENOUS
Status: DISCONTINUED | OUTPATIENT
Start: 2021-06-29 | End: 2021-06-29 | Stop reason: HOSPADM

## 2021-06-29 RX ORDER — MIDAZOLAM HYDROCHLORIDE 1 MG/ML
1 INJECTION, SOLUTION INTRAMUSCULAR; INTRAVENOUS AS NEEDED
Status: DISCONTINUED | OUTPATIENT
Start: 2021-06-29 | End: 2021-06-29 | Stop reason: HOSPADM

## 2021-06-29 RX ORDER — SODIUM CHLORIDE 9 MG/ML
25 INJECTION, SOLUTION INTRAVENOUS CONTINUOUS
Status: DISCONTINUED | OUTPATIENT
Start: 2021-06-29 | End: 2021-06-29 | Stop reason: HOSPADM

## 2021-06-29 RX ORDER — FENTANYL CITRATE 50 UG/ML
25 INJECTION, SOLUTION INTRAMUSCULAR; INTRAVENOUS
Status: DISCONTINUED | OUTPATIENT
Start: 2021-06-29 | End: 2021-06-29 | Stop reason: HOSPADM

## 2021-06-29 RX ORDER — ACETAMINOPHEN 325 MG/1
650 TABLET ORAL ONCE
Status: COMPLETED | OUTPATIENT
Start: 2021-06-29 | End: 2021-06-29

## 2021-06-29 RX ORDER — SODIUM CHLORIDE, SODIUM LACTATE, POTASSIUM CHLORIDE, CALCIUM CHLORIDE 600; 310; 30; 20 MG/100ML; MG/100ML; MG/100ML; MG/100ML
100 INJECTION, SOLUTION INTRAVENOUS CONTINUOUS
Status: DISCONTINUED | OUTPATIENT
Start: 2021-06-29 | End: 2021-06-29 | Stop reason: HOSPADM

## 2021-06-29 RX ORDER — LIDOCAINE HYDROCHLORIDE 10 MG/ML
0.1 INJECTION, SOLUTION EPIDURAL; INFILTRATION; INTRACAUDAL; PERINEURAL AS NEEDED
Status: DISCONTINUED | OUTPATIENT
Start: 2021-06-29 | End: 2021-06-29 | Stop reason: HOSPADM

## 2021-06-29 RX ORDER — SODIUM CHLORIDE 0.9 % (FLUSH) 0.9 %
5-40 SYRINGE (ML) INJECTION AS NEEDED
Status: DISCONTINUED | OUTPATIENT
Start: 2021-06-29 | End: 2021-06-29 | Stop reason: HOSPADM

## 2021-06-29 RX ORDER — ROPIVACAINE HYDROCHLORIDE 5 MG/ML
30 INJECTION, SOLUTION EPIDURAL; INFILTRATION; PERINEURAL AS NEEDED
Status: DISCONTINUED | OUTPATIENT
Start: 2021-06-29 | End: 2021-06-29 | Stop reason: HOSPADM

## 2021-06-29 RX ORDER — FENTANYL CITRATE 50 UG/ML
50 INJECTION, SOLUTION INTRAMUSCULAR; INTRAVENOUS AS NEEDED
Status: DISCONTINUED | OUTPATIENT
Start: 2021-06-29 | End: 2021-06-29 | Stop reason: HOSPADM

## 2021-06-29 RX ORDER — PROPOFOL 10 MG/ML
INJECTION, EMULSION INTRAVENOUS AS NEEDED
Status: DISCONTINUED | OUTPATIENT
Start: 2021-06-29 | End: 2021-06-29 | Stop reason: HOSPADM

## 2021-06-29 RX ORDER — SODIUM CHLORIDE 0.9 % (FLUSH) 0.9 %
5-40 SYRINGE (ML) INJECTION EVERY 8 HOURS
Status: DISCONTINUED | OUTPATIENT
Start: 2021-06-29 | End: 2021-06-29 | Stop reason: HOSPADM

## 2021-06-29 RX ORDER — PROPOFOL 10 MG/ML
INJECTION, EMULSION INTRAVENOUS
Status: DISCONTINUED | OUTPATIENT
Start: 2021-06-29 | End: 2021-06-29 | Stop reason: HOSPADM

## 2021-06-29 RX ORDER — ONDANSETRON 2 MG/ML
4 INJECTION INTRAMUSCULAR; INTRAVENOUS AS NEEDED
Status: DISCONTINUED | OUTPATIENT
Start: 2021-06-29 | End: 2021-06-29 | Stop reason: HOSPADM

## 2021-06-29 RX ORDER — ONDANSETRON 2 MG/ML
INJECTION INTRAMUSCULAR; INTRAVENOUS AS NEEDED
Status: DISCONTINUED | OUTPATIENT
Start: 2021-06-29 | End: 2021-06-29 | Stop reason: HOSPADM

## 2021-06-29 RX ORDER — DIPHENHYDRAMINE HYDROCHLORIDE 50 MG/ML
12.5 INJECTION, SOLUTION INTRAMUSCULAR; INTRAVENOUS AS NEEDED
Status: DISCONTINUED | OUTPATIENT
Start: 2021-06-29 | End: 2021-06-29 | Stop reason: HOSPADM

## 2021-06-29 RX ORDER — MIDAZOLAM HYDROCHLORIDE 1 MG/ML
0.5 INJECTION, SOLUTION INTRAMUSCULAR; INTRAVENOUS
Status: DISCONTINUED | OUTPATIENT
Start: 2021-06-29 | End: 2021-06-29 | Stop reason: HOSPADM

## 2021-06-29 RX ORDER — BUPIVACAINE HYDROCHLORIDE AND EPINEPHRINE 5; 5 MG/ML; UG/ML
30 INJECTION, SOLUTION EPIDURAL; INTRACAUDAL; PERINEURAL ONCE
Status: DISCONTINUED | OUTPATIENT
Start: 2021-06-29 | End: 2021-06-29 | Stop reason: HOSPADM

## 2021-06-29 RX ORDER — BUPIVACAINE HYDROCHLORIDE AND EPINEPHRINE 5; 5 MG/ML; UG/ML
INJECTION, SOLUTION EPIDURAL; INTRACAUDAL; PERINEURAL AS NEEDED
Status: DISCONTINUED | OUTPATIENT
Start: 2021-06-29 | End: 2021-06-29 | Stop reason: HOSPADM

## 2021-06-29 RX ORDER — HYDROMORPHONE HYDROCHLORIDE 1 MG/ML
0.5 INJECTION, SOLUTION INTRAMUSCULAR; INTRAVENOUS; SUBCUTANEOUS
Status: DISCONTINUED | OUTPATIENT
Start: 2021-06-29 | End: 2021-06-29 | Stop reason: HOSPADM

## 2021-06-29 RX ORDER — GLYCOPYRROLATE 0.2 MG/ML
INJECTION INTRAMUSCULAR; INTRAVENOUS AS NEEDED
Status: DISCONTINUED | OUTPATIENT
Start: 2021-06-29 | End: 2021-06-29 | Stop reason: HOSPADM

## 2021-06-29 RX ADMIN — PROPOFOL 25 MG: 10 INJECTION, EMULSION INTRAVENOUS at 11:34

## 2021-06-29 RX ADMIN — ONDANSETRON HYDROCHLORIDE 4 MG: 2 INJECTION, SOLUTION INTRAMUSCULAR; INTRAVENOUS at 11:33

## 2021-06-29 RX ADMIN — PROPOFOL 25 MG: 10 INJECTION, EMULSION INTRAVENOUS at 11:32

## 2021-06-29 RX ADMIN — PROPOFOL 25 MG: 10 INJECTION, EMULSION INTRAVENOUS at 11:36

## 2021-06-29 RX ADMIN — SODIUM CHLORIDE, POTASSIUM CHLORIDE, SODIUM LACTATE AND CALCIUM CHLORIDE 100 ML/HR: 600; 310; 30; 20 INJECTION, SOLUTION INTRAVENOUS at 11:16

## 2021-06-29 RX ADMIN — WATER 2 G: 1 INJECTION INTRAMUSCULAR; INTRAVENOUS; SUBCUTANEOUS at 11:33

## 2021-06-29 RX ADMIN — PROPOFOL 75 MCG/KG/MIN: 10 INJECTION, EMULSION INTRAVENOUS at 11:30

## 2021-06-29 RX ADMIN — GLYCOPYRROLATE 0.2 MG: 0.2 INJECTION, SOLUTION INTRAMUSCULAR; INTRAVENOUS at 11:25

## 2021-06-29 RX ADMIN — LIDOCAINE HYDROCHLORIDE 60 MG: 20 INJECTION, SOLUTION EPIDURAL; INFILTRATION; INTRACAUDAL; PERINEURAL at 11:30

## 2021-06-29 RX ADMIN — PROPOFOL 25 MG: 10 INJECTION, EMULSION INTRAVENOUS at 11:30

## 2021-06-29 RX ADMIN — ACETAMINOPHEN 650 MG: 325 TABLET ORAL at 11:19

## 2021-06-29 NOTE — INTERVAL H&P NOTE
Update History & Physical    The Patient's History and Physical of June 21, 2021 was reviewed with the patient and I examined the patient. There was new information. She is 5 weeks pregnant. I communicated with her Obstetrician, Dr. Betzaida Laura, who suggested t is OK to proceed with her planned operation. Will keep things as light as possible. The surgical site was confirmed by the patient and me. Plan:  The risk, benefits, expected outcome, and alternative to the recommended procedure have been discussed with the patient. Patient understands and wants to proceed with the procedure.     Electronically signed by Suri Chung MD on 6/29/2021 at 9:10 AM

## 2021-06-29 NOTE — ROUTINE PROCESS
Patient: Monique Burks MRN: [id number]  SSN: [id number]   YOB: 1985  Age: 30 y.o. Sex: female     Patient is status post Procedure(s):  EXCISION OF MELANOMA OF THE LEFT NECK  . Aura Ibarra     Surgeon(s) and Role:     Roberto Bland MD - Primary    Local/Dose/Irrigation:  9ml                  Peripheral IV 06/29/21 Left;Posterior Hand (Active)                           Dressing/Packing:  Incision 06/29/21 Neck-Dressing/Treatment: Skin glue (06/29/21 1159)    Splint/Cast:  ]    Other:

## 2021-06-29 NOTE — BRIEF OP NOTE
Brief Postoperative Note    Patient: Claus Max  YOB: 1985  MRN: [id number]    Date of Procedure: 6/29/2021     Pre-Op Diagnosis: MELANOMA OF NECK    Post-Op Diagnosis: Same as preoperative diagnosis. Procedure(s):  EXCISION OF MELANOMA OF THE LEFT NECK; attempted sentinel lymph node lacalization  .     Surgeon(s):  Farhana Anaya MD    Surgical Assistant: Surg Asst-1: Christian Hernandez    Anesthesia: MAC     Estimated Blood Loss (mL): Minimal    Complications: None    Specimens:   ID Type Source Tests Collected by Time Destination   1 : MELANOMA MARKED SHORT SUPERIOR, LONG LATERAL Fresh Neck  Farhana Anaya MD 6/29/2021 1155 Pathology        Implants: * No implants in log *    Drains: * No LDAs found *    Findings: 4 x 2.5 cm ellipse removed    Electronically Signed by Thomas Pittman MD on 6/29/2021 at 12:12 PM  [id number]

## 2021-06-29 NOTE — PERIOP NOTES
Spoke with Dr. Naldo Juarez via phone concerning  Nuclear Medicine's inability to see lymph node via scan. Okay to proceed per Dr. Naldo Juarez. OR Supervisor Cinthya Helton made aware.

## 2021-06-29 NOTE — ANESTHESIA PREPROCEDURE EVALUATION
Relevant Problems   HEMATOLOGY   (+) Beta thalassemia minor      PERSONAL HX & FAMILY HX OF CANCER   (+) Melanoma of neck (HCC)       Anesthetic History   No history of anesthetic complications            Review of Systems / Medical History  Patient summary reviewed, nursing notes reviewed and pertinent labs reviewed    Pulmonary  Within defined limits                 Neuro/Psych   Within defined limits           Cardiovascular  Within defined limits                Exercise tolerance: >4 METS     GI/Hepatic/Renal  Within defined limits              Endo/Other  Within defined limits           Other Findings              Physical Exam    Airway  Mallampati: II  TM Distance: > 6 cm  Neck ROM: normal range of motion   Mouth opening: Normal     Cardiovascular  Regular rate and rhythm,  S1 and S2 normal,  no murmur, click, rub, or gallop             Dental  No notable dental hx       Pulmonary  Breath sounds clear to auscultation               Abdominal  GI exam deferred       Other Findings            Anesthetic Plan    ASA: 2  Anesthesia type: MAC and total IV anesthesia

## 2021-06-29 NOTE — PERIOP NOTES
L & D consulted to determine if pt needs to have FHT at 5 wks pregnant. L & D nurse stated that FHTs are done after 8 wks so none are to be done at this time.

## 2021-06-29 NOTE — ANESTHESIA POSTPROCEDURE EVALUATION
Procedure(s):  EXCISION OF MELANOMA OF THE LEFT NECK. MAC    Anesthesia Post Evaluation        Patient location during evaluation: PACU  Note status: Adequate. Level of consciousness: responsive to verbal stimuli and sleepy but conscious  Pain management: satisfactory to patient  Airway patency: patent  Anesthetic complications: no  Cardiovascular status: acceptable  Respiratory status: acceptable  Hydration status: acceptable  Comments: +Post-Anesthesia Evaluation and Assessment    Patient: Angélica Casas MRN: [id number]  SSN: [id number]   YOB: 1985  Age: 30 y.o. Sex: female          Cardiovascular Function/Vital Signs    /61 (BP 1 Location: Right upper arm, BP Patient Position: At rest)   Pulse 78   Temp 36.7 °C (98.1 °F)   Resp 21   Ht 5' 6\" (1.676 m)   Wt 62.6 kg (138 lb 0.1 oz)   SpO2 100%   BMI 22.28 kg/m²     Patient is status post Procedure(s):  EXCISION OF MELANOMA OF THE LEFT NECK. Nausea/Vomiting: Controlled. Postoperative hydration reviewed and adequate. Pain:  Pain Scale 1: Numeric (0 - 10) (06/29/21 1245)  Pain Intensity 1: 0 (06/29/21 1245)   Managed. Neurological Status:   Neuro (WDL): Within Defined Limits (06/29/21 1245)   At baseline. Mental Status and Level of Consciousness: Arousable. Pulmonary Status:   O2 Device: None (Room air) (06/29/21 1245)   Adequate oxygenation and airway patent. Complications related to anesthesia: None    Post-anesthesia assessment completed. No concerns. I have evaluated the patient and the patient is stable and ready to be discharged from PACU . Signed By: Emeli Harris MD    6/29/2021        INITIAL Post-op Vital signs:   Vitals Value Taken Time   /61 06/29/21 1245   Temp 36.7 °C (98.1 °F) 06/29/21 1245   Pulse 69 06/29/21 1256   Resp 15 06/29/21 1256   SpO2 100 % 06/29/21 1250   Vitals shown include unvalidated device data.

## 2021-06-29 NOTE — DISCHARGE INSTRUCTIONS
Patient Education        Melanoma Excision Surgery: What to Expect at Home  Your Recovery  Excision of a melanoma is a type of surgery to remove, or excise, a melanoma from your skin. Melanoma is a form of skin cancer in which abnormal skin cells grow out of control. You may have stitches until the surgical wound heals. This may cause a scar that should fade with time. How quickly your wound heals depends on its size. Most wounds take 1 to 3 weeks to heal. If a large area of skin was removed, you may have a skin graft. In that case, healing may take longer. Some soreness around the site of the wound is normal. Your doctor may recommend an over-the-counter medicine or give you a prescription to help if you have pain. Your doctor may give you specific instructions on when you can do your normal activities again, such as driving and going back to work. This care sheet gives you a general idea about how long it will take for you to recover. But each person recovers at a different pace. Follow the steps below to get better as quickly as possible. How can you care for yourself at home? Activity    · If you have stitches, check with your doctor about when you can do your normal activities.     · If you have a skin graft, avoid exercise that stretches the skin graft for at least 3 weeks after surgery, unless your doctor gives you other instructions. Medicines    · Your doctor will tell you if and when you can restart your medicines. He or she will also give you instructions about taking any new medicines.     · If you take aspirin or some other blood thinner, ask your doctor if and when to start taking it again. Make sure that you understand exactly what your doctor wants you to do.     · Be safe with medicines. Read and follow all instructions on the label. ? If the doctor gave you a prescription medicine for pain, take it as prescribed.   ? If you are not taking a prescription pain medicine, ask your doctor if you can take an over-the-counter medicine. Wound care    · You will have a dressing over the wound. A dressing helps the wound heal and protects it. Your doctor will tell you how to take care of this.     · If you have stitches, your doctor will tell you when to come back to have them removed.     · If you have a skin graft, your doctor will tell you how to change the bandages and when you don't need them anymore.     · Wash the area daily with warm, soapy water, and pat it dry. Don't use hydrogen peroxide or alcohol. They can slow healing.     · You may shower 24 to 48 hours after surgery. Pat the wound dry. Do not take a bath for the first 2 weeks, or until your doctor tells you it is okay.     · If you have a skin graft, don't rub it for 3 to 4 weeks. Follow-up care is a key part of your treatment and safety. Be sure to make and go to all appointments, and call your doctor if you are having problems. It's also a good idea to know your test results and keep a list of the medicines you take. When should you call for help? Call 911 anytime you think you may need emergency care. For example, call if:    · You passed out (lost consciousness).     · You are short of breath. Call your doctor now or seek immediate medical care if:    · You have pain that does not get better after you take pain medicine.     · You have loose stitches, or your wound comes open.     · Bright red blood has soaked through the bandage over your incision.     · You cannot pass stools or gas.     · You are sick to your stomach or cannot drink fluids.     · You have signs of a blood clot in your leg (called a deep vein thrombosis), such as:  ? Pain in your calf, back of the knee, thigh, or groin. ? Redness or swelling in your leg.     · You have symptoms of infection, such as:  ? Increased pain, swelling, warmth, or redness. ? Red streaks leading from the incision. ? Pus draining from the incision. ? A fever.    Watch closely for changes in your health, and be sure to contact your doctor if you have any problems. Where can you learn more? Go to http://www.gray.com/  Enter W224 in the search box to learn more about \"Melanoma Excision Surgery: What to Expect at Home. \"  Current as of: 2020               Content Version: 12.8   [employer]. Care instructions adapted under license by [employer] (which disclaims liability or warranty for this information). If you have questions about a medical condition or this instruction, always ask your healthcare professional. [place] [address] any warranty or liability for your use of this information. Patient Discharge Instructions    [employer] / [id number] : 1985    Admitted 2021 Discharged: 2021     Take Home Medications            · It is important that you take the medication exactly as they are prescribed. · Keep your medication in the bottles provided by the pharmacist and keep a list of the medication names, dosages, and times to be taken in your wallet. · Do not take other medications without consulting your doctor. What to do at Home    Recommended diet: Regular Diet,     Recommended activity: Activity as tolerated, may shower whenever you wish; Tylenol and Ibuprofen should suffice for pain control          Follow-up Appointments   Procedures    FOLLOW UP VISIT Appointment in: Two Weeks     Standing Status:   Standing     Number of Occurrences:   1     Order Specific Question:   Appointment in     Answer: Two Weeks           Information obtained by :  I understand that if any problems occur once I am at home I am to contact my physician. I understand and acknowledge receipt of the instructions indicated above.                                                                                                                                            Physician's or R.N.'s Signature                                                                  Date/Time                                                                                                                                              Patient or Representative Signature                                                          Date/Time    ______________________________________________________________________    Anesthesia Discharge Instructions    After general anesthesia or intervenous sedation, for 24 hours or while taking prescription Narcotics:  · Limit your activities  · Do not drive or operate hazardous machinery  · If you have not urinated within 8 hours after discharge, please contact your surgeon on call. · Do not make important personal or business decisions  · Do not drink alcoholic beverages    Report the following to your surgeon:  · Excessive pain, swelling, redness or odor of or around the surgical area  · Temperature over 100.5 degrees  · Nausea and vomiting lasting longer than 4 hours or if unable to take medication  · Any signs of decreased circulation or nerve impairment to extremity:  Change in color, persistent numbness, tingling, coldness or increased pain.   · Any questions

## 2021-06-30 NOTE — OP NOTES
[address]  OPERATIVE REPORT    Name:  Sana Aldrich  MR#:  [id number]  :  1985  ACCOUNT #:  [de-identified]  DATE OF SERVICE:  2021    PREOPERATIVE DIAGNOSIS:  Melanoma of the left neck. POSTOPERATIVE DIAGNOSIS:  Melanoma of the left neck. PROCEDURES PERFORMED:  1. Excision of melanoma of the left neck with layered closure. 2.  Attempted sentinel lymph node localization. SURGEON:  Janel Maravilla MD    ASSISTANT:  Christian Hernandez SA    ANESTHESIA:  Monitored anesthesia care plus 0.5% Marcaine with epinephrine. COMPLICATIONS:  None. SPECIMENS REMOVED:  Skin containing the melanoma. IMPLANTS:  None. ESTIMATED BLOOD LOSS:  Minimal.    DRAINS:  None. FINDINGS:  Taking out a 4 x 2.5 cm ellipse of skin and closing it in layers. PROCEDURE:  With the patient supine and suitably sedated, the left neck was prepared with ChloraPrep and draped as a field. 0.5% Marcaine with epinephrine was infiltrated in the skin and an ellipse measuring 4 x 2.5 cm was removed in a subplatysmal plane. Care was taken to avoid the external jugular vein. At the last bit of removal, there was a small branch of the vein which required suture ligature to control bleeding. The specimen was marked by the pathologist.  At this point, the preoperative nuclear scan for sentinel node biopsy was not helpful because of the amount of positivity in the skin and surrounding areas. I thought maybe it would be possible to identify a sentinel node after the lesion had been removed, and therefore using Neoprobe, I interrogated the area, unfortunately the background count was incredibly high throughout precluding any successful localization of lymph node. At this point, I just abandoned that concept. The incision was closed with a running 3-0 Vicryl to the platysmal layer followed by a running subcuticular Monocryl followed by Dermabond for the skin.   At the termination of the procedure, all counts were correct. The patient tolerated this well and was brought to the PACU in satisfactory condition.       Ashok Aguilera MD      [id number]  D:  06/29/2021 12:23  T:  06/29/2021 23:05  JOB #:  [id number]  CC:  MD Fabian Gaston MD Cheri Nam, MD

## 2021-07-15 LAB
CHOLEST SERPL-MCNC: 101 MG/DL
GLUCOSE SERPL-MCNC: 76 MG/DL (ref 65–100)
HDLC SERPL-MCNC: 39 MG/DL
LDLC SERPL CALC-MCNC: 49.4 MG/DL (ref 0–100)
TRIGL SERPL-MCNC: 63 MG/DL (ref ?–150)

## 2021-07-16 ENCOUNTER — VIRTUAL VISIT (OUTPATIENT)
Dept: SURGERY | Age: 36
End: 2021-07-16
Payer: COMMERCIAL

## 2021-07-16 VITALS — HEIGHT: 66 IN | BODY MASS INDEX: 22.02 KG/M2 | WEIGHT: 137 LBS

## 2021-07-16 DIAGNOSIS — Z09 FOLLOW-UP EXAM: Primary | ICD-10-CM

## 2021-07-16 DIAGNOSIS — Z85.820 HISTORY OF MELANOMA EXCISION: ICD-10-CM

## 2021-07-16 DIAGNOSIS — Z98.890 HISTORY OF MELANOMA EXCISION: ICD-10-CM

## 2021-07-16 PROCEDURE — 99024 POSTOP FOLLOW-UP VISIT: CPT | Performed by: NURSE PRACTITIONER

## 2021-07-16 NOTE — PATIENT INSTRUCTIONS
The glue on the incision will continue to fall off    You may wash the area as normal    Okay to apply some unscented moisturizer or scar cream to the area. You may gently massage the area to help soften the scar as well as do some neck stretches to help with the tightness.     Continue sun protection and follow-up dermatology

## 2021-07-16 NOTE — PROGRESS NOTES
1. Have you been to the ER, urgent care clinic since your last visit? Hospitalized since your last visit? No     2. Have you seen or consulted any other health care providers outside of the [address] [place] [address] since your last visit? Include any pap smears or colon screening.  No

## 2021-07-16 NOTE — PROGRESS NOTES
Chief Complaint   Patient presents with    Post OP Follow Up     18 days s/p excision melanoma left neck. [phone]     I was in the office while conducting this encounter. Consent:  She and/or her healthcare decision maker is aware that this patient-initiated Telehealth encounter is a billable service, with coverage as determined by her insurance carrier. She is aware that she may receive a bill and has provided verbal consent to proceed: No - Not billable    This virtual visit was conducted via [employer]. Pursuant to the emergency declaration under the [facility] waiver authority and the [employer] Act, this Virtual  Visit was conducted to reduce the patient's risk of exposure to COVID-19 and provide continuity of care for an established patient. Services were provided through a video synchronous discussion virtually to substitute for in-person clinic visit. Due to this being a TeleHealth evaluation, many elements of the physical examination are unable to be assessed. Total Time: minutes: 5-10 minutes. Reta Sullivan is 18 days status post excision of left neck melanoma with Dr. Taco Wadsworth. She feels well. She says the area still little bit numb and feels \"tight\". No fever, chills, chest pain or shortness of breath  No drainage from the incision  She is almost 8 weeks pregnant and says she is doing well from that  Pathology   Skin, neck, wide excision:   Dermal scar with no evidence of residual malignant melanoma     Tolerating regular diet  She has back at work    Visit Vitals  Ht 5' 6\" (1.676 m)   Wt 137 lb (62.1 kg)   LMP 05/21/2021 (Exact Date)   BMI 22.11 kg/m²     Appears well  Speech is clear breathing is unlabored  Left neck incision appears clean, dry, intact and without erythema or induration; glue remains in place      ICD-10-CM ICD-9-CM    1. Follow-up exam  Z09 V67.9    2.  History of melanoma excision  Z98.890 V15.29     Z85.820       Almost 3-week status post excision of left neck melanoma doing well  Pathology was reviewed with patient  Advised to continue to keep area clean and dry  She may shower and bathe as normal and she may get into a swimming pool  Okay to use a gentle moisturizer or scar cream on the area and gentle massage as well as some neck stretches to help with the tightness  She does have follow-up with dermatology  Sun protection reviewed  Discharge from surgical care with as needed follow-up

## 2021-07-22 ENCOUNTER — VIRTUAL VISIT (OUTPATIENT)
Dept: INTERNAL MEDICINE CLINIC | Age: 36
End: 2021-07-22
Payer: COMMERCIAL

## 2021-07-22 DIAGNOSIS — O99.281 HYPOTHYROIDISM DURING PREGNANCY IN FIRST TRIMESTER: ICD-10-CM

## 2021-07-22 DIAGNOSIS — C43.4 MELANOMA OF NECK (HCC): ICD-10-CM

## 2021-07-22 DIAGNOSIS — E03.9 HYPOTHYROIDISM DURING PREGNANCY IN FIRST TRIMESTER: ICD-10-CM

## 2021-07-22 DIAGNOSIS — E03.9 ACQUIRED HYPOTHYROIDISM: ICD-10-CM

## 2021-07-22 DIAGNOSIS — Z3A.09 9 WEEKS GESTATION OF PREGNANCY: Primary | ICD-10-CM

## 2021-07-22 PROCEDURE — 99214 OFFICE O/P EST MOD 30 MIN: CPT | Performed by: INTERNAL MEDICINE

## 2021-07-22 RX ORDER — LEVOTHYROXINE SODIUM 50 UG/1
TABLET ORAL
Qty: 90 TABLET | Refills: 0 | Status: CANCELLED | OUTPATIENT
Start: 2021-07-22

## 2021-07-22 NOTE — PROGRESS NOTES
CC: Medication Evaluation      HPI:    She is a 30 y.o. female who presents for evaluation of hypothyroidism     In the interval patient had melanoma  - excised and knows to follow up with dermatologist    Hx of hypothyroidism currently on synthroid 50 mcg   Denies brittles naild hair loss  Had TSH checked prior to pregnancy and not since then   Discussed we will likely increase the dose based on levels today     Patient is  weeks pregnant    Migraines - take imitrex for abortive therapy - discussed that is contraindicated in pregnancy. Thalassemia minor hx : mild anemia    First pregnancy     No ETOH   works      This is an established visit conducted via telemedicine with video. The patient has been instructed that this meets HIPAA criteria and acknowledges and agrees to this method of visitation. Pursuant to the emergency declaration under the [facility] waiver authority and the [employer] Act, this Virtual Visit was conducted, with patient's consent, to reduce the patient's risk of exposure to COVID-19 and provide continuity of care for an established patient. Services were provided through a video synchronous discussion virtually to substitute for in-person clinic visit. ROS:  Constitutional: negative for fevers, chills, anorexia and weight loss  10 systems reviewed and negative other than HPI     Past Medical History:   Diagnosis Date    Beta thalassemia minor     Gilbert syndrome     Melanoma of neck ([facility] [address] 6/21/2021    Thyroid disease        Current Outpatient Medications on File Prior to Visit   Medication Sig Dispense Refill    levothyroxine (SYNTHROID) 50 mcg tablet TAKE 1 TABLET BY MOUTH ONE TIME A DAY (BEFORE BREAKFAST) 90 Tablet 0    prenatal vit-iron fumarate-fa 27 mg iron- 0.8 mg tab tablet Take 1 Tablet by mouth daily.        No current facility-administered medications on file prior to visit. Past Surgical History:   Procedure Laterality Date    HX BREAST AUGMENTATION  5/13/2013    BREAST AUGMENTATION performed by Ines Man MD at [address] HX OTHER SURGICAL Left 06/29/2021    Excision Melanoma left neck @ [facility] by Dr. Valle Self History   Problem Relation Age of Onset    Diabetes Mother     Cancer Father     Thyroid Disease Sister     No Known Problems Brother     Blindness Brother     Anesth Problems Neg Hx      Reviewed and no changes     Social History     Socioeconomic History    Marital status:      Spouse name: Not on file    Number of children: Not on file    Years of education: Not on file    Highest education level: Not on file   Occupational History    Not on file   Tobacco Use    Smoking status: Never Smoker    Smokeless tobacco: Never Used   Vaping Use    Vaping Use: Never used   Substance and Sexual Activity    Alcohol use: Not Currently     Comment: PREGNANT    Drug use: No    Sexual activity: Yes   Other Topics Concern    Not on file   Social History Narrative    Not on file     Social Determinants of Health     Financial Resource Strain:     Difficulty of Paying Living Expenses:    Food Insecurity:     Worried About Running Out of Food in the Last Year:     [address] in the Last Year:    Transportation Needs:     Lack of Transportation (Medical):      Lack of Transportation (Non-Medical):    Physical Activity:     Days of Exercise per Week:     Minutes of Exercise per Session:    Stress:     Feeling of Stress :    Social Connections:     Frequency of Communication with Friends and Family:     Frequency of Social Gatherings with Friends and Family:     Attends Evangelical Services:     Active Member of Clubs or Organizations:     Attends Club or Organization Meetings:     Marital Status:    Intimate Partner Violence:     Fear of Current or Ex-Partner:     Emotionally Abused:     Physically Abused:     Sexually Abused:             Visit Vitals  LMP 05/21/2021 (Exact Date)       Physical Examination:   Gen: well appearing female  HEENT: normal conjunctiva, no audible congestion, patient does not see oral erythema, has MMM  Neck: patient does not feel enlarged or tender LAD or masses  Resp: normal respiratory effort, no audible wheezing. CV: patient does not feel palpitations or heart irregularity  Abd: patient does not feel abdominal tenderness or mass, patient does not notice distension  Extrem: patient does not see swelling in ankles or joints.    Neuro: Alert and oriented, able to answer questions without difficulty, able to move all extremities and walk normally          Lab Results   Component Value Date/Time    WBC 6.5 02/21/2020 08:59 AM    HGB 10.3 (L) 02/21/2020 08:59 AM    HCT 33.2 (L) 02/21/2020 08:59 AM    PLATELET 385 11/70/8522 08:59 AM    MCV 65 (L) 02/21/2020 08:59 AM     Lab Results   Component Value Date/Time    Sodium 140 02/21/2020 08:59 AM    Potassium 4.3 02/21/2020 08:59 AM    Chloride 104 02/21/2020 08:59 AM    CO2 22 02/21/2020 08:59 AM    Glucose 76 07/15/2021 07:03 AM    BUN 9 02/21/2020 08:59 AM    Creatinine 0.78 02/21/2020 08:59 AM    BUN/Creatinine ratio 12 02/21/2020 08:59 AM    GFR est  02/21/2020 08:59 AM    GFR est non- 02/21/2020 08:59 AM    Calcium 9.6 02/21/2020 08:59 AM     Lab Results   Component Value Date/Time    Cholesterol, total 101 07/15/2021 07:03 AM    HDL Cholesterol 39 07/15/2021 07:03 AM    LDL, calculated 49.4 07/15/2021 07:03 AM    VLDL, calculated 10 02/21/2020 08:59 AM    Triglyceride 63 07/15/2021 07:03 AM     Lab Results   Component Value Date/Time    TSH 0.791 09/03/2020 12:36 PM     No results found for: PSA, [place] Gale, PSA3, PSAR3, QFR780252, MFB224291  No results found for: HBA1C, MDH3FAYS, FGB0STAD, PAA8DFVO  No results found for: Susan Jones, VD3RIA    Lab Results Component Value Date/Time    ALT (SGPT) 14 02/21/2020 08:59 AM    Alk. phosphatase 37 (L) 02/21/2020 08:59 AM    Bilirubin, total 3.6 (H) 02/21/2020 08:59 AM           Assessment/Plan:    1. 9 weeks gestation of pregnancy      2. Acquired hypothyroidism  Discussed will likely increase levothyroxine - currently on 50 mcg daily. Patient to do labs this week   - TSH 3RD GENERATION; Future  - T4, FREE; Future  Pending results will likely  Increase daily dosing to a total of nine doses per week (double the daily dose two days each week). Further dose changes are made based upon serum TSH concentrations measured every four weeks   TSH goal is less than 2.5       3. Migraines: stop taking imitrex as abortive, discussed patient may take tylenol and antihistamine and should increase water intake     4. Melanoma: excised 2021/ follow up with dermatologist       Jayleen Marcum MD    This is an established visit conducted via real time video and audio telemedicine. The patient has been instructed that this meets HIPAA criteria and acknowledges and agrees to this method of visitation.

## 2021-07-23 ENCOUNTER — APPOINTMENT (OUTPATIENT)
Dept: INTERNAL MEDICINE CLINIC | Age: 36
End: 2021-07-23

## 2021-07-24 LAB
T4 FREE SERPL-MCNC: 1.2 NG/DL (ref 0.8–1.5)
TSH SERPL DL<=0.05 MIU/L-ACNC: 0.37 UIU/ML (ref 0.36–3.74)

## 2021-07-25 NOTE — PROGRESS NOTES
Please call patient  Thyroid function is at goal for pregnancy, repeat in 4 weeks, must be monitored every 4 weeks during pregnancy, lab ordered.  Patient come in week of august 23 for labs

## 2021-08-06 NOTE — PROGRESS NOTES
Fransisca Nagy is a 30 y.o. female here for new patient appt for melanoma in left neck. Melanoma was excised 6/29/21. Referred by Dr Parth Fields. 1. Have you been to the ER, urgent care clinic since your last visit? Hospitalized since your last visit? New Pt    2. Have you seen or consulted any other health care providers outside of the [address] [place] [address] since your last visit? Include any pap smears or colon screening. New Pt    She is 11 weeks pregnant.

## 2021-08-09 ENCOUNTER — OFFICE VISIT (OUTPATIENT)
Dept: ONCOLOGY | Age: 36
End: 2021-08-09
Payer: COMMERCIAL

## 2021-08-09 ENCOUNTER — DOCUMENTATION ONLY (OUTPATIENT)
Dept: ONCOLOGY | Age: 36
End: 2021-08-09

## 2021-08-09 VITALS
TEMPERATURE: 98.2 F | BODY MASS INDEX: 22.24 KG/M2 | WEIGHT: 138.4 LBS | DIASTOLIC BLOOD PRESSURE: 70 MMHG | SYSTOLIC BLOOD PRESSURE: 125 MMHG | HEIGHT: 66 IN | OXYGEN SATURATION: 99 % | HEART RATE: 70 BPM

## 2021-08-09 DIAGNOSIS — C43.4 MALIGNANT MELANOMA OF NECK (HCC): Primary | ICD-10-CM

## 2021-08-09 LAB
ANTIBODY SCREEN, EXTERNAL: NEGATIVE
CHLAMYDIA, EXTERNAL: NEGATIVE
HBSAG, EXTERNAL: NORMAL
HEPATITIS C AB,   EXT: NORMAL
HIV, EXTERNAL: NORMAL
N. GONORRHEA, EXTERNAL: NEGATIVE
RUBELLA, EXTERNAL: NORMAL
T. PALLIDUM, EXTERNAL: NEGATIVE
TYPE, ABO & RH, EXTERNAL: NORMAL

## 2021-08-09 PROCEDURE — 99205 OFFICE O/P NEW HI 60 MIN: CPT | Performed by: STUDENT IN AN ORGANIZED HEALTH CARE EDUCATION/TRAINING PROGRAM

## 2021-08-09 NOTE — PROGRESS NOTES
[facility] [place] at [address] [facility] [address]  W: [phone] F: [phone]      Reason for Visit:   Monique Burks is a 30 y.o. female who is seen in consultation at the request of Dr. Madai Burciaga, Dr. Camille Oreilly for evaluation of cutaneous melanoma. Hematology / Oncology Treatment History:     Hematological/Oncological Diagnosis: Clinical Stage 1B, [id number], Cutaneous Melanoma    Date of Diagnosis: 6/15/21    Treatment course:   6/15/21: Shave Biopsy revealed a Breslow depth of 1.94 mm non-ulcerated, no vascular invasion, no micro satellites. Mitotic rate of 5/mm2.          6/29/21: Wide Local Excision -- Final pathology showed no evidence of any residual melanoma. [place] node biopsy could not be performed at that time. No clinical evidence of neck lymphadenopathy      History of Present Illness:     Very pleasant 28-year-old with a past history most notable for hypothyroidism, currently pregnant at about 11 weeks, history of beta thalassemia minor, Gilbert syndrome, presents for evaluation and treatment of recently discovered cutaneous melanoma. The patient's clinical course began when she presented to her dermatologist for a mole check in May. She was noted to have a irritated mole in the left lateral neck base that was subsequently removed with shave biopsy by Dr. Kailey Islas as well on May 26, 2021. Subsequent pathology returned as a T2 a malignant melanoma with a Breslow thickness of 1.94 mm. There is no lipid present for several years though over the last 3 months, the patient has had some bleeding over that area. No other constitutional symptoms were reported at time of initial consultation. At time of initial consult, the patient was 11 weeks pregnant. No reported complications with the pregnancy. She denies any lymphadenopathy or any other palpable skin changes. No reported neurological changes.     Family history reviewed, notable for unspecified cancer in her father, diabetes in her mother. Social history reviewed, negative for significant alcohol or tobacco use. Positive ROS findings include: History of recent skin excision. No other pertinent positive findings. Review of Systems: A complete review of systems was obtained, negative except as described above. Past Medical History:   Diagnosis Date    Beta thalassemia minor     Gilbert syndrome     Melanoma of neck ([address] 6/21/2021    Thyroid disease       Past Surgical History:   Procedure Laterality Date    HX BREAST AUGMENTATION  5/13/2013    BREAST [facility]UG[facility]ATION performed by Kathy Kent MD at [facility]    HX OTHER SURGICAL Left 06/29/2021    Excision Melanoma left neck @ [facility] by Dr. Diaz Brenner History     Tobacco Use    Smoking status: Never Smoker    Smokeless tobacco: Never Used   Substance Use Topics    Alcohol use: Not Currently     Comment: PREGNANT      Family History   Problem Relation Age of Onset    Diabetes Mother     Cancer Father     Thyroid Disease Sister     No Known Problems Brother     Blindness Brother     Anesth Problems Neg Hx      Current Outpatient Medications   Medication Sig    levothyroxine (SYNTHROID) 50 mcg tablet TAKE 1 TABLET BY MOUTH ONE TIME A DAY (BEFORE BREAKFAST)    prenatal vit-iron fumarate-fa 27 mg iron- 0.8 mg tab tablet Take 1 Tablet by mouth daily. No current facility-administered medications for this visit.       No Known Allergies     Physical Exam:     Visit Vitals  /70   Pulse 70   Temp 98.2 °F (36.8 °C)   Ht 5' 6\" (1.676 m)   Wt 138 lb 6.4 oz (62.8 kg)   LMP 05/21/2021 (Exact Date)   SpO2 99%   BMI 22.34 kg/m²     ECOG PS: 0  General: alert, cooperative, no distress   Mental  status: normal mood, behavior, speech, dress, motor activity, and thought processes, able to follow commands   HENT: NCAT   Neck: No cervical, axillary, supraclavicular lymphadenopathy. WLE site is clean and healing nicely. Resp: no respiratory distress   Neuro: no gross deficits   Skin: no discoloration or lesions of concern on visible areas   Psychiatric: normal affect, consistent with stated mood, no evidence of hallucinations           Results:     Lab Results   Component Value Date/Time    WBC 6.5 02/21/2020 08:59 AM    HGB 10.3 (L) 02/21/2020 08:59 AM    HCT 33.2 (L) 02/21/2020 08:59 AM    PLATELET 760 62/80/6582 08:59 AM    MCV 65 (L) 02/21/2020 08:59 AM    ABS. NEUTROPHILS 2.4 02/21/2020 08:59 AM     Lab Results   Component Value Date/Time    Sodium 140 02/21/2020 08:59 AM    Potassium 4.3 02/21/2020 08:59 AM    Chloride 104 02/21/2020 08:59 AM    CO2 22 02/21/2020 08:59 AM    Glucose 76 07/15/2021 07:03 AM    BUN 9 02/21/2020 08:59 AM    Creatinine 0.78 02/21/2020 08:59 AM    GFR est  02/21/2020 08:59 AM    GFR est non- 02/21/2020 08:59 AM    Calcium 9.6 02/21/2020 08:59 AM     Lab Results   Component Value Date/Time    Bilirubin, total 3.6 (H) 02/21/2020 08:59 AM    ALT (SGPT) 14 02/21/2020 08:59 AM    Alk. phosphatase 37 (L) 02/21/2020 08:59 AM    Protein, total 6.8 02/21/2020 08:59 AM    Albumin 4.9 (H) 02/21/2020 08:59 AM         Assessment and Recommendations:      # Clinical Stage 1B, [id number], Cutaneous Melanoma  - [place] Lymph node could not be identified on surgical resection. No residual malignancy seen on wide local excision.   - Plan for baseline neck ultrasound to evaluate for lymphadenopathy. If negative, can plan surveillance q3 months with neck ultrasound. - The patient is 11 weeks pregnant, limiting use of other imaging techniques  - Plan for follow up in about 3 months with plan for review of neck ultrasound via phone encounter.      - Today I discussed the overall course for surveillance of early stage cutaneous melanoma.   As the patient was not able to have sentinel node biopsy, Nx disease should be monitored with neck ultrasonography.      - Chart review showed hyperbilirubinemia in last CMP on record, likely from known history of Gilbert's disease.      - Social work was consulted to aide in discussions of overall well being with this new diagnosis of malignancy. Patient in agreement with treatment course and plan. She was asked to call with any new or worsening symptoms or development of neck lymphadenopathy. The patient verbalized understanding and agreement.          Signed By: Kayce Taylor MD      Attending Medical Oncologist   [facility]

## 2021-08-09 NOTE — PROGRESS NOTES
Oncology Social Work  Psychosocial Assessment    Reason for Assessment:      [] Social Work Referral [x] Initial Assessment [x] New Diagnosis [] Other    Advance Care Planning:  Advance Care Planning 6/28/2021   Confirm Advance Directive None   Patient Would Like to Complete Advance Directive No   Does the patient have other document types Organ Directive       Sources of Information:    [x]Patient  []Family  [x]Staff  [x]Medical Record    Mental Status:    [x]Alert  []Lethargic  []Unresponsive   [] Unable to assess   Oriented to:  [x]Person  [x]Place  [x]Time  [x]Situation      Relationship Status:  []Single  [x]  []Significant Other/Life Partner  []  []  []    Living Circumstances:  []Lives Alone  [x]Family/Significant Other in Household  []Roommates  []Children in the Home  []Paid Caregivers  []Assisted Living Facility/Group [address]  []Homeless  []Incarcerated  []Environmental/Care Concerns  []Other:    Employment Status:  [x]Employed Full-time []Employed Part-time []Homemaker  [] Retired [] Short-Term Disability [] [facility]  [] Unemployed     Barriers to Learning:    []Language  []Developmental  []Cognitive  []Altered Mental Status  []Visual/Hearing Impairment  []Unable to Read/Write  []Motivational  [] Challenges Understanding Medical Jargon [x]No Barriers Identified      Financial/Legal Concerns:    []Uninsured  []Limited Income/Resources  []Non-Citizen  []Food Insecurity [x]No Concerns Identified   []Other:    Catholic/Spiritual/Existential:  Does patient have any spiritual or Tenriism beliefs? [x] Yes [] No  Is the patient involved in a spiritual, coco or Tenriism community?  [x] Yes [] No  Patient expressing spiritual/existential angst? [] Yes [] No  Notes:    Support System:    Identified Support Person/Group:  []Strong  [x]Fair  []Limited    Coping with Illness:   [x]  Coping Well  [] Challenges Coping with Serious Illness [] Situational Depression [] Situational Anxiety [] Anticipatory Grief  [] Recent Loss [] Caregiver [place]            Narrative: Met with patient  to introduce social work navigator role and supports. PT has been  for 5 years and is expecting first child in February. 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 Couple attends [employer] in [place] but hasn't been very active with pandemic. Plan:   1. Introduce self and role of the  in the [address] 2. Informed the patient of the [facility] and available resources there. 3. Continue to meet with the patient when  returns to the clinic for ongoing assessment ofthe patient's adjustment to  diagnosis and treatment. 4. Ongoing psychosocial support as desired by patient. Referral:   Complementary therapies referral  Insurance/Entitlements referral  Financial/Medication assistance referral       Arpan Ferraro.  YOKASTA Hinkle/GOLDEN  Supervisee in Social Work

## 2021-08-13 ENCOUNTER — PATIENT OUTREACH (OUTPATIENT)
Dept: OTHER | Age: 36
End: 2021-08-13

## 2021-08-13 NOTE — PATIENT INSTRUCTIONS
Learning About When to Call Your Doctor During Pregnancy (Up to 20 Weeks)  Your Care Instructions     It's common to have concerns about what might be a problem during pregnancy. Although most pregnant women don't have any serious problems, it's important to know when to call your doctor if you have certain symptoms. These are general suggestions. Your doctor may give you some more information about when to call. When to call your doctor (up to 20 weeks)  Call 911 anytime you think you may need emergency care. For example, call if:  · You passed out (lost consciousness). Call your doctor now or seek immediate medical care if:  · You have a fever. · You have vaginal bleeding. · You are dizzy or lightheaded, or you feel like you may faint. · You have symptoms of a urinary tract infection. These may include:  ? Pain or burning when you urinate. ? A frequent need to urinate without being able to pass much urine. ? Pain in the flank, which is just below the rib cage and above the waist on either side of the back. ? Blood in your urine. · You have belly pain. · You think you are having contractions. · You have a sudden release of fluid from your vagina. Watch closely for changes in your health, and be sure to contact your doctor if:  · You have vaginal discharge that smells bad. · You have other concerns about your pregnancy. Follow-up care is a key part of your treatment and safety. Be sure to make and go to all appointments, and call your doctor if you are having problems. It's also a good idea to know your test results and keep a list of the medicines you take. Where can you learn more? Go to http://www.gray.com/  Enter H632 in the search box to learn more about \"Learning About When to Call Your Doctor During Pregnancy (Up to 20 Weeks). \"  Current as of: October 8, 2020               Content Version: 12.8  © 2209-9225 Healthwise, Incorporated.    Care instructions adapted under license by niid.to (which disclaims liability or warranty for this information). If you have questions about a medical condition or this instruction, always ask your healthcare professional. [address] any warranty or liability for your use of this information. Weeks 10 to 14 of Your Pregnancy: Care Instructions  Overview     By weeks 10 to 14 of your pregnancy, the placenta has formed inside your uterus. The placenta's main job is to give your baby oxygen and nutrients through the umbilical cord. It's possible to hear your baby's heartbeat with a special ultrasound device. Your baby's organs are developing. The arms and legs can bend. This is a good time to think about testing for birth defects. There are two types of tests: screening and diagnostic. Screening tests show the chance that a baby has a certain birth defect. They can't tell you for sure that your baby has a problem. Diagnostic tests show if a baby has a certain birth defect. It's your choice whether to have these tests. You and your partner can talk to your doctor or midwife about tests for birth defects. Follow-up care is a key part of your treatment and safety. Be sure to make and go to all appointments, and call your doctor if you are having problems. It's also a good idea to know your test results and keep a list of the medicines you take. How can you care for yourself at home? Decide about tests  · You can have screening tests and diagnostic tests to check for birth defects. The decision to have a test for birth defects is personal. Think about your age, your chance of passing on a family disease, your need to know about any problems, and what you might do after you have the test results. ? Quadruple (quad) blood test. This screening test can be done between 15 and 22 weeks of pregnancy. It checks the amount of four substances in your blood.  The doctor looks at these test results, along with your age and other factors, to find out the chance that your baby may have certain problems. ? Amniocentesis. This diagnostic test is used to look for chromosomal problems in the baby's cells. It can be done between 15 and 20 weeks of pregnancy, usually around week 16.  ? Nuchal translucency test. This test uses ultrasound to measure the thickness of the area at the back of the baby's neck. An increase in the thickness can be an early sign of Down syndrome. ? Chorionic villus sampling (CVS). This is a test that looks for certain genetic problems with your baby. The same genes that are in your baby are in the placenta. A small piece of the placenta is taken out and tested. This test is done when you are 10 to 13 weeks pregnant. Ease discomfort  · Slow down and take naps when you feel tired. · If your emotions swing, talk to someone. · If your gums bleed, try a softer toothbrush. If your gums are puffy and bleed a lot, see your dentist.  · If you feel dizzy:  ? Get up slowly after sitting or lying down. ? Drink plenty of fluids. ? Eat small snacks to keep your blood sugar stable. ? Put your head between your legs as though you were tying your shoelaces. ? Lie down with your legs higher than your head. Use pillows to prop up your feet. · If you have a headache:  ? Lie down. ? Ask your partner or a good friend for a neck massage. ? Try cool cloths over your forehead or across the back of your neck. ? Use acetaminophen (Tylenol) for pain relief. Do not use nonsteroidal anti-inflammatory drugs (NSAIDs), such as ibuprofen (Advil, Motrin) or naproxen (Aleve), unless your doctor says it is okay. · If you have a nosebleed, pinch your nose gently, and hold it for a short while. To prevent nosebleeds, try massaging a small dab of petroleum jelly, such as Vaseline, in your nostrils. · If your nose is stuffed up, try saline (saltwater) nose sprays. Do not use decongestant sprays.   Care for your breasts  · Wear a bra that gives you good support. · Know that changes in your breasts are normal.  ? Your breasts may get larger and more tender. Tenderness usually gets better by 12 weeks. ? Your nipples may get darker and larger, and small bumps around your nipples may show more. ? The veins in your chest and breasts may show more. Where can you learn more? Go to http://www.gray.com/  Enter K727 in the search box to learn more about \"Weeks 10 to 14 of Your Pregnancy: Care Instructions. \"  Current as of: October 8, 2020               Content Version: 12.8  © 8432-3960 [employer]. Care instructions adapted under license by [employer] (which disclaims liability or warranty for this information). If you have questions about a medical condition or this instruction, always ask your healthcare professional. [facility] [address] any warranty or liability for your use of this information. Managing Morning Sickness: Care Instructions  Overview     Morning sickness can be the toughest part of early pregnancy. Some people feel mildly sick to their stomach, and others are running to the bathroom. The good news? Morning sickness usually gets better in the second trimester. It's likely that your hormones are to blame for morning sickness. But you can do things to feel better, like changing what you eat, avoiding certain foods and smells, and asking your doctor about medicines you can try. Follow-up care is a key part of your treatment and safety. Be sure to make and go to all appointments, and call your doctor if you are having problems. It's also a good idea to know your test results and keep a list of the medicines you take. How can you care for yourself at home? · Keep food in your stomach, but not too much at once. Your nausea may be worse if your stomach is empty. Eat five or six small meals a day instead of three large meals.   · For morning nausea, eat a small snack, such as a couple of crackers or dry biscuits, before rising. Allow a few minutes for your stomach to settle before you get out of bed slowly. · Drink plenty of fluids. If you have kidney, heart, or liver disease and have to limit fluids, talk with your doctor before you increase the amount of fluids you drink. Some women find that peppermint tea helps with nausea. · Eat more protein, such as chicken, fish, lean meat, beans, nuts, and seeds. · Eat carbohydrate foods, such as potatoes, whole-grain cereals, rice, and pasta. · Avoid smells and foods that make you feel nauseated. Spicy or high-fat foods, citrus juice, milk, coffee, and tea with caffeine often make nausea worse. · Do not drink alcohol. · Do not smoke. Try not to be around others who smoke. If you need help quitting, talk to your doctor about stop-smoking programs and medicines. These can increase your chances of quitting for good. · If you are taking iron supplements, ask your doctor if they are necessary. Iron can make nausea worse. · Get lots of rest. Stress and fatigue can make your morning sickness worse. · Ask your doctor about taking prescription medicine, or over-the-counter products such as vitamin B6, doxylamine, or gab, to relieve your symptoms. Your doctor can tell you the doses that are safe for you. · Take your prenatal vitamins at night on a full stomach. When should you call for help? Call 911 anytime you think you may need emergency care. For example, call if:    · You passed out (lost consciousness). Call your doctor now or seek immediate medical care if:    · You are sick to your stomach or cannot drink fluids.     · You have symptoms of dehydration, such as:  ? Dry eyes and a dry mouth. ? Passing only a little urine. ? Feeling thirstier than usual.     · You are not able to keep down your medicine.     · You have pain in your belly or pelvis.    Watch closely for changes in your health, and be sure to contact your doctor if:    · You do not get better as expected. Where can you learn more? Go to http://www.gray.com/  Enter W450 in the search box to learn more about \"Managing Morning Sickness: Care Instructions. \"  Current as of: October 8, 2020               Content Version: 12.8  © 0988-6432 Control[employer]. Care instructions adapted under license by [employer] (which disclaims liability or warranty for this information). If you have questions about a medical condition or this instruction, always ask your healthcare professional. [address] any warranty or liability for your use of this information. Nutrition During Pregnancy: Care Instructions  Your Care Instructions     Healthy eating when you are pregnant is important for you and your baby. It can help you feel well and have a successful pregnancy and delivery. During pregnancy your nutrition needs increase. Even if you have excellent eating habits, your doctor may recommend a multivitamin to make sure you get enough iron and folic acid. Many pregnant women wonder how much weight they should gain. In general, women who were at a healthy weight before they became pregnant should gain between 25 and 35 pounds. Women who were overweight before pregnancy are usually advised to gain 15 to 25 pounds. Women who were underweight before pregnancy are usually advised to gain 28 to 40 pounds. Your doctor will work with you to set a weight goal that is right for you. Gaining a healthy amount of weight helps you have a healthy baby. Follow-up care is a key part of your treatment and safety. Be sure to make and go to all appointments, and call your doctor if you are having problems. It's also a good idea to know your test results and keep a list of the medicines you take. How can you care for yourself at home? · Eat plenty of fruits and vegetables.  Include a variety of orange, yellow, and leafy dark-green vegetables every day. · Choose whole-grain bread, cereal, and pasta. Good choices include whole wheat bread, whole wheat pasta, brown rice, and oatmeal.  · Get 4 or more servings of milk and milk products each day. Good choices include nonfat or low-fat milk, yogurt, and cheese. If you cannot eat milk products, you can get calcium from calcium-fortified products such as orange juice, soy milk, and tofu. Other non-milk sources of calcium include leafy green vegetables, such as broccoli, kale, mustard greens, turnip greens, bok sarai, and brussels sprouts. · If you eat meat, pick lower-fat types. Good choices include lean cuts of meat and chicken or turkey without the skin. · Do not eat shark, swordfish, abi mackerel, or tilefish. They have high levels of mercury, which is dangerous to your baby. You can eat up to 12 ounces a week of fish or shellfish that have low mercury levels. Good choices include shrimp, wild salmon, pollock, and catfish. Do not eat more than 6 ounces of tuna each week. · Heat lunch meats (such as turkey, ham, or bologna) to 165°F before you eat them. This reduces your risk of getting sick from a kind of bacteria that can be found in lunch meats. · Do not eat unpasteurized soft cheeses, such as brie, feta, fresh mozzarella, and blue cheese. They have a bacteria that could harm your baby. · Limit caffeine. If you drink coffee or tea, have no more than 1 cup a day. Caffeine is also found in hari. · Do not drink any alcohol. No amount of alcohol has been found to be safe during pregnancy. · Do not diet or try to lose weight. For example, do not follow a low-carbohydrate diet. If you are overweight at the start of your pregnancy, your doctor will work with you to manage your weight gain. · Tell your doctor about all vitamins and supplements you take. When should you call for help?   Watch closely for changes in your health, and be sure to contact your doctor if you have any problems. Where can you learn more? Go to http://www.gray.com/  Enter Y785 in the search box to learn more about \"Nutrition During Pregnancy: Care Instructions. \"  Current as of: October 8, 2020               Content Version: 12.8  © 2944-6393 Healthwise, Incorporated. Care instructions adapted under license by [employer] (which disclaims liability or warranty for this information). If you have questions about a medical condition or this instruction, always ask your healthcare professional. [address] any warranty or liability for your use of this information.

## 2021-08-13 NOTE — PROGRESS NOTES
Patient eligible for [employer] [facility] Manager contacted the patient by telephone to discuss the maternity management program.  Patient agrees to care management services at this time. Verified name and  with patient as identifiers. Risk Factors Identified: Anemia and hypothyroidism/ beta thalassemia minor    Needs to be reviewed by the provider   none         Method of communication with provider : none    Does patient have OB/Gyn Selected? Yes    Discussed follow up appointments. If no appointment was previously scheduled, appointment scheduling offered: Yes  [facility] follow up appointment(s):   Future Appointments   Date Time Provider Mili Nunez   2021 11:30 AM Ailin Barbosa MD ONCMR BS [facility]     [facility]    OB History:   OB History    Para Term  AB Living   1             SAB TAB Ectopic Molar Multiple Live Births                    # Outcome Date GA Lbr Gurmeet/2nd Weight Sex Delivery Anes PTL Lv   1 Current                12w0d    Medication reconciliation was performed with patient, who verbalizes understanding of administration of home medications. Advised obtaining a 90-day supply of all daily and as-needed medications. Barriers/Support system:     Return to work planning? Yes    2nd and 3rd Trimester Focused Assessment: 12 weeks  erendira 22  Healthy behavior review, Labs reviewed-discussed the hg labs she had new ones drawn this week still waiting results and Red flags: bleeding/leaking   S/w pt today she is doing well so far she is still working. Her thyroid concerns are being handled by pcp and they are adjusting her meds as needed. Pt had no additional questions or concerns. No additional mcm needs at this time. Discussed bwwb and pt has received both items    Birth planning: delivery at [facility]?  No      Patient given an opportunity to ask questions and does not have any further questions or concerns at this time. Reviewed appropriate site of care based on symptoms and resources available to patient including: Benefits related nurse triage line, When to call 911 and Condition related references. The patient agrees to contact the OB/Gyn office for questions related to their healthcare.

## 2021-08-25 ENCOUNTER — HOSPITAL ENCOUNTER (OUTPATIENT)
Dept: ULTRASOUND IMAGING | Age: 36
Discharge: HOME | End: 2021-08-25
Attending: STUDENT IN AN ORGANIZED HEALTH CARE EDUCATION/TRAINING PROGRAM
Payer: COMMERCIAL

## 2021-08-25 DIAGNOSIS — C43.4 MALIGNANT MELANOMA OF NECK (HCC): ICD-10-CM

## 2021-08-25 PROCEDURE — 76536 US EXAM OF HEAD AND NECK: CPT

## 2021-08-31 ENCOUNTER — LAB ONLY (OUTPATIENT)
Dept: INTERNAL MEDICINE CLINIC | Age: 36
End: 2021-08-31

## 2021-08-31 DIAGNOSIS — O99.281 HYPOTHYROIDISM DURING PREGNANCY IN FIRST TRIMESTER: ICD-10-CM

## 2021-08-31 DIAGNOSIS — E03.9 HYPOTHYROIDISM DURING PREGNANCY IN FIRST TRIMESTER: ICD-10-CM

## 2021-08-31 LAB
T4 FREE SERPL-MCNC: 1.1 NG/DL (ref 0.8–1.5)
TSH SERPL DL<=0.05 MIU/L-ACNC: 0.65 UIU/ML (ref 0.36–3.74)

## 2021-09-13 RX ORDER — LEVOTHYROXINE SODIUM 50 UG/1
TABLET ORAL
Qty: 90 TABLET | Refills: 0 | Status: SHIPPED | OUTPATIENT
Start: 2021-09-13 | End: 2021-12-15

## 2021-10-01 ENCOUNTER — TELEPHONE (OUTPATIENT)
Dept: INTERNAL MEDICINE CLINIC | Age: 36
End: 2021-10-01

## 2021-10-01 ENCOUNTER — LAB ONLY (OUTPATIENT)
Dept: INTERNAL MEDICINE CLINIC | Age: 36
End: 2021-10-01

## 2021-10-01 DIAGNOSIS — E03.9 HYPOTHYROIDISM DURING PREGNANCY IN FIRST TRIMESTER: Primary | ICD-10-CM

## 2021-10-01 DIAGNOSIS — E03.9 HYPOTHYROIDISM DURING PREGNANCY IN FIRST TRIMESTER: ICD-10-CM

## 2021-10-01 DIAGNOSIS — O99.281 HYPOTHYROIDISM DURING PREGNANCY IN FIRST TRIMESTER: Primary | ICD-10-CM

## 2021-10-01 DIAGNOSIS — O99.281 HYPOTHYROIDISM DURING PREGNANCY IN FIRST TRIMESTER: ICD-10-CM

## 2021-10-01 LAB
T4 FREE SERPL-MCNC: 1.1 NG/DL (ref 0.8–1.5)
TSH SERPL DL<=0.05 MIU/L-ACNC: 0.57 UIU/ML (ref 0.36–3.74)

## 2021-10-04 ENCOUNTER — PATIENT MESSAGE (OUTPATIENT)
Dept: INTERNAL MEDICINE CLINIC | Age: 36
End: 2021-10-04

## 2021-10-04 DIAGNOSIS — O99.281 HYPOTHYROIDISM DURING PREGNANCY IN FIRST TRIMESTER: Primary | ICD-10-CM

## 2021-10-04 DIAGNOSIS — E03.9 HYPOTHYROIDISM DURING PREGNANCY IN FIRST TRIMESTER: Primary | ICD-10-CM

## 2021-10-27 ENCOUNTER — TELEPHONE (OUTPATIENT)
Dept: ONCOLOGY | Age: 36
End: 2021-10-27

## 2021-10-27 NOTE — TELEPHONE ENCOUNTER
Patient in today requesting thyroid level again   Shared with her  lab results and they are in range     She said md discussed checking monthly since she is pregnant     Forwarded to md for advisement     Em lpn

## 2021-10-27 NOTE — TELEPHONE ENCOUNTER
Pt has an appointment coming up with Leoncio Billingsley and she wants to know if she needs another ultrasound done before that appointment. Please call pt back.

## 2021-10-28 DIAGNOSIS — C43.4 MALIGNANT MELANOMA OF NECK (HCC): Primary | ICD-10-CM

## 2021-10-28 NOTE — TELEPHONE ENCOUNTER
Return call placed to pt. Message left to return nurse call. Pt needs to be informed she needs to have a ultrasound done prior to her appt w/ Dr. Kaiser Gaines and she can call scheduling to schedule.

## 2021-11-05 ENCOUNTER — HOSPITAL ENCOUNTER (OUTPATIENT)
Dept: ULTRASOUND IMAGING | Age: 36
Discharge: HOME | End: 2021-11-05
Attending: STUDENT IN AN ORGANIZED HEALTH CARE EDUCATION/TRAINING PROGRAM
Payer: COMMERCIAL

## 2021-11-05 DIAGNOSIS — C43.4 MALIGNANT MELANOMA OF NECK (HCC): ICD-10-CM

## 2021-11-05 PROCEDURE — 76536 US EXAM OF HEAD AND NECK: CPT

## 2021-11-15 NOTE — PROGRESS NOTES
Dena Butts is a 29 y.o. female here for 3 month follow up for melanoma in left neck. Pt states she has been doing well. No concerns brought up. 1. Have you been to the ER, urgent care clinic since your last visit? Hospitalized since your last visit? no    2. Have you seen or consulted any other health care providers outside of the [address] [place] [address] since your last visit? Include any pap smears or colon screening.  OB

## 2021-11-16 ENCOUNTER — OFFICE VISIT (OUTPATIENT)
Dept: ONCOLOGY | Age: 36
End: 2021-11-16
Payer: COMMERCIAL

## 2021-11-16 VITALS
HEART RATE: 76 BPM | DIASTOLIC BLOOD PRESSURE: 67 MMHG | TEMPERATURE: 98.2 F | BODY MASS INDEX: 24.56 KG/M2 | SYSTOLIC BLOOD PRESSURE: 106 MMHG | HEIGHT: 66 IN | OXYGEN SATURATION: 100 % | WEIGHT: 152.8 LBS

## 2021-11-16 DIAGNOSIS — C43.4 MALIGNANT MELANOMA OF NECK (HCC): Primary | ICD-10-CM

## 2021-11-16 PROCEDURE — 99214 OFFICE O/P EST MOD 30 MIN: CPT | Performed by: STUDENT IN AN ORGANIZED HEALTH CARE EDUCATION/TRAINING PROGRAM

## 2021-11-16 NOTE — PROGRESS NOTES
[facility] [place] at [facility] [address]  W: [phone] F: [phone]      Reason for Visit:   Dena Butts is a 29 y.o. female who is seen in consultation at the request of Dr. Von Polk, Dr. Osito Martini for evaluation of cutaneous melanoma. Hematology / Oncology Treatment History:     Hematological/Oncological Diagnosis: Clinical Stage 1B, [id number], Cutaneous Melanoma    Date of Diagnosis: 6/15/21    Treatment course:   6/15/21: Shave Biopsy revealed a Breslow depth of 1.94 mm non-ulcerated, no vascular invasion, no micro satellites. Mitotic rate of 5/mm2.          6/29/21: Wide Local Excision -- Final pathology showed no evidence of any residual melanoma. [place] node biopsy could not be performed at that time. No clinical evidence of neck lymphadenopathy      History of Present Illness:     Very pleasant 78-year-old with a past history most notable for hypothyroidism, currently pregnant at about 11 weeks, history of beta thalassemia minor, Gilbert syndrome, presents for evaluation and treatment of recently discovered cutaneous melanoma. The patient's clinical course began when she presented to her dermatologist for a mole check in May. She was noted to have a irritated mole in the left lateral neck base that was subsequently removed with shave biopsy by Dr. Rafat Avila as well on May 26, 2021. Subsequent pathology returned as a T2 a malignant melanoma with a Breslow thickness of 1.94 mm. There is no lipid present for several years though over the last 3 months, the patient has had some bleeding over that area. No other constitutional symptoms were reported at time of initial consultation. At time of initial consult, the patient was 11 weeks pregnant. No reported complications with the pregnancy. She denies any lymphadenopathy or any other palpable skin changes. No reported neurological changes.     Family history reviewed, notable for unspecified cancer in her father, diabetes in her mother. Social history reviewed, negative for significant alcohol or tobacco use. Positive ROS findings include: History of recent skin excision. No other pertinent positive findings. Review of Systems: A complete review of systems was obtained, negative except as described above. Interval History:     11/16/21:  Patient has no new clinical symptoms since last evaluation. Interval neck ultrasound shows no lymphadenopathy. No there new clinical changes reported. The patient is 26 weeks pregnant, without complication. Past Medical History:   Diagnosis Date    Beta thalassemia minor     Gilbert syndrome     Melanoma of neck ([address] 6/21/2021    Thyroid disease       Past Surgical History:   Procedure Laterality Date    HX BREAST AUGMENTATION  5/13/2013    BREAST AUGMENTATION performed by Apple Ashley MD at [facility]    HX OTHER SURGICAL Left 06/29/2021    Excision Melanoma left neck @ [facility] by Dr. Cinthia Cherry History     Tobacco Use    Smoking status: Never Smoker    Smokeless tobacco: Never Used   Substance Use Topics    Alcohol use: Not Currently     Comment: PREGNANT      Family History   Problem Relation Age of Onset    Diabetes Mother     Cancer Father     Thyroid Disease Sister     No Known Problems Brother     Blindness Brother     Anesth Problems Neg Hx      Current Outpatient Medications   Medication Sig    levothyroxine (SYNTHROID) 50 mcg tablet TAKE 1 TABLET BY MOUTH ONE TIME A DAY BEFORE BREAKFAST    prenatal vit-iron fumarate-fa 27 mg iron- 0.8 mg tab tablet Take 1 Tablet by mouth daily. No current facility-administered medications for this visit.       No Known Allergies     Physical Exam:     Visit Vitals  /67   Pulse 76   Temp 98.2 °F (36.8 °C)   Ht 5' 6\" (1.676 m)   Wt 152 lb 12.8 oz (69.3 kg)   LMP 05/21/2021 (Exact Date)   SpO2 100% BMI 24.66 kg/m²     ECOG PS: 0  General: alert, cooperative, no distress   Mental  status: normal mood, behavior, speech, dress, motor activity, and thought processes, able to follow commands   HENT: NCAT   Neck: No cervical, axillary, supraclavicular lymphadenopathy. WLE site is clean and healing nicely. Resp: no respiratory distress   Neuro: no gross deficits   Skin: no discoloration or lesions of concern on visible areas   Psychiatric: normal affect, consistent with stated mood, no evidence of hallucinations           Results:     Lab Results   Component Value Date/Time    WBC 6.5 02/21/2020 08:59 AM    HGB 10.3 (L) 02/21/2020 08:59 AM    HCT 33.2 (L) 02/21/2020 08:59 AM    PLATELET 143 46/56/9775 08:59 AM    MCV 65 (L) 02/21/2020 08:59 AM    ABS. NEUTROPHILS 2.4 02/21/2020 08:59 AM     Lab Results   Component Value Date/Time    Sodium 140 02/21/2020 08:59 AM    Potassium 4.3 02/21/2020 08:59 AM    Chloride 104 02/21/2020 08:59 AM    CO2 22 02/21/2020 08:59 AM    Glucose 76 07/15/2021 07:03 AM    BUN 9 02/21/2020 08:59 AM    Creatinine 0.78 02/21/2020 08:59 AM    GFR est  02/21/2020 08:59 AM    GFR est non- 02/21/2020 08:59 AM    Calcium 9.6 02/21/2020 08:59 AM     Lab Results   Component Value Date/Time    Bilirubin, total 3.6 (H) 02/21/2020 08:59 AM    ALT (SGPT) 14 02/21/2020 08:59 AM    Alk. phosphatase 37 (L) 02/21/2020 08:59 AM    Protein, total 6.8 02/21/2020 08:59 AM    Albumin 4.9 (H) 02/21/2020 08:59 AM         Assessment and Recommendations:      # Clinical Stage 1B, [id number], Cutaneous Melanoma  - [place] Lymph node could not be identified on surgical resection. No residual malignancy seen on wide local excision.   - Plan for baseline neck ultrasound to evaluate for lymphadenopathy. If negative, can plan surveillance q3 months with neck ultrasound. - last U/S neck 11/5/21 - no evidence of lymphadenopathy  - patient planned for delivery in February.      - Will plan for PET scan to be completed 2 weeks after delivery. Will plan to schedule in early March. - Follow up in March to review PET scan results. Patient in agreement with treatment course and plan. She was asked to call with any new or worsening symptoms or development of neck lymphadenopathy. The patient verbalized understanding and agreement.          Signed By: Barry Gomez MD      Attending Medical Oncologist   [facility]

## 2021-12-15 RX ORDER — LEVOTHYROXINE SODIUM 50 UG/1
TABLET ORAL
Qty: 90 TABLET | Refills: 0 | Status: SHIPPED | OUTPATIENT
Start: 2021-12-15 | End: 2022-03-08 | Stop reason: SDUPTHER

## 2022-01-28 LAB — GRBS, EXTERNAL: NEGATIVE

## 2022-02-10 ENCOUNTER — HOSPITAL ENCOUNTER (OUTPATIENT)
Dept: PREADMISSION TESTING | Age: 37
Discharge: HOME | End: 2022-02-10
Payer: COMMERCIAL

## 2022-02-10 PROCEDURE — U0005 INFEC AGEN DETEC AMPLI PROBE: HCPCS

## 2022-02-11 LAB
SARS-COV-2, XPLCVT: NOT DETECTED
SOURCE, COVRS: NORMAL

## 2022-02-26 ENCOUNTER — ANESTHESIA (OUTPATIENT)
Dept: LABOR AND DELIVERY | Age: 37
End: 2022-02-26
Payer: COMMERCIAL

## 2022-02-26 ENCOUNTER — ANESTHESIA EVENT (OUTPATIENT)
Dept: LABOR AND DELIVERY | Age: 37
End: 2022-02-26
Payer: COMMERCIAL

## 2022-02-26 ENCOUNTER — HOSPITAL ENCOUNTER (INPATIENT)
Age: 37
LOS: 3 days | Discharge: HOME | End: 2022-03-01
Attending: OBSTETRICS & GYNECOLOGY | Admitting: OBSTETRICS & GYNECOLOGY
Payer: COMMERCIAL

## 2022-02-26 PROBLEM — O48.0 POST-DATES PREGNANCY: Status: ACTIVE | Noted: 2022-02-26

## 2022-02-26 PROBLEM — Z37.9 NORMAL LABOR: Status: ACTIVE | Noted: 2022-02-26

## 2022-02-26 LAB
ABO + RH BLD: NORMAL
ALBUMIN SERPL-MCNC: 2.9 G/DL (ref 3.5–5)
ALBUMIN/GLOB SERPL: 0.8 {RATIO} (ref 1.1–2.2)
ALP SERPL-CCNC: 124 U/L (ref 45–117)
ALT SERPL-CCNC: 20 U/L (ref 12–78)
AMPHET UR QL SCN: NEGATIVE
ANION GAP SERPL CALC-SCNC: 8 MMOL/L (ref 5–15)
AST SERPL-CCNC: 28 U/L (ref 15–37)
BARBITURATES UR QL SCN: NEGATIVE
BASOPHILS # BLD: 0.2 K/UL (ref 0–0.1)
BASOPHILS NFR BLD: 1 % (ref 0–1)
BENZODIAZ UR QL: NEGATIVE
BILIRUB SERPL-MCNC: 2.1 MG/DL (ref 0.2–1)
BLOOD GROUP ANTIBODIES SERPL: NORMAL
BUN SERPL-MCNC: 7 MG/DL (ref 6–20)
BUN/CREAT SERPL: 9 (ref 12–20)
CALCIUM SERPL-MCNC: 9.6 MG/DL (ref 8.5–10.1)
CANNABINOIDS UR QL SCN: NEGATIVE
CHLORIDE SERPL-SCNC: 108 MMOL/L (ref 97–108)
CO2 SERPL-SCNC: 21 MMOL/L (ref 21–32)
COCAINE UR QL SCN: NEGATIVE
COVID-19 RAPID TEST, COVR: NOT DETECTED
CREAT SERPL-MCNC: 0.76 MG/DL (ref 0.55–1.02)
DIFFERENTIAL METHOD BLD: ABNORMAL
DRUG SCRN COMMENT,DRGCM: NORMAL
EOSINOPHIL # BLD: 0 K/UL (ref 0–0.4)
EOSINOPHIL NFR BLD: 0 % (ref 0–7)
ERYTHROCYTE [DISTWIDTH] IN BLOOD BY AUTOMATED COUNT: 17.1 % (ref 11.5–14.5)
GLOBULIN SER CALC-MCNC: 3.8 G/DL (ref 2–4)
GLUCOSE SERPL-MCNC: 76 MG/DL (ref 65–100)
HCT VFR BLD AUTO: 32.7 % (ref 35–47)
HGB BLD-MCNC: 9.7 G/DL (ref 11.5–16)
IMM GRANULOCYTES # BLD AUTO: 0.3 K/UL (ref 0–0.04)
IMM GRANULOCYTES NFR BLD AUTO: 2 % (ref 0–0.5)
LYMPHOCYTES # BLD: 2 K/UL (ref 0.8–3.5)
LYMPHOCYTES NFR BLD: 12 % (ref 12–49)
MCH RBC QN AUTO: 20.3 PG (ref 26–34)
MCHC RBC AUTO-ENTMCNC: 29.7 G/DL (ref 30–36.5)
MCV RBC AUTO: 68.3 FL (ref 80–99)
METHADONE UR QL: NEGATIVE
MONOCYTES # BLD: 1 K/UL (ref 0–1)
MONOCYTES NFR BLD: 6 % (ref 5–13)
NEUTS SEG # BLD: 13.1 K/UL (ref 1.8–8)
NEUTS SEG NFR BLD: 79 % (ref 32–75)
NRBC # BLD: 0.04 K/UL (ref 0–0.01)
NRBC BLD-RTO: 0.2 PER 100 WBC
OPIATES UR QL: NEGATIVE
PCP UR QL: NEGATIVE
PLATELET # BLD AUTO: 210 K/UL (ref 150–400)
PLATELET COMMENTS,PCOM: ABNORMAL
PMV BLD AUTO: ABNORMAL FL (ref 8.9–12.9)
POTASSIUM SERPL-SCNC: 4 MMOL/L (ref 3.5–5.1)
PROT SERPL-MCNC: 6.7 G/DL (ref 6.4–8.2)
RBC # BLD AUTO: 4.79 M/UL (ref 3.8–5.2)
RBC MORPH BLD: ABNORMAL
SARS-COV-2, COV2: NORMAL
SODIUM SERPL-SCNC: 137 MMOL/L (ref 136–145)
SOURCE, COVRS: NORMAL
SPECIMEN EXP DATE BLD: NORMAL
WBC # BLD AUTO: 16.6 K/UL (ref 3.6–11)

## 2022-02-26 PROCEDURE — 86900 BLOOD TYPING SEROLOGIC ABO: CPT

## 2022-02-26 PROCEDURE — 59200 INSERT CERVICAL DILATOR: CPT

## 2022-02-26 PROCEDURE — 65410000002 HC RM PRIVATE OB

## 2022-02-26 PROCEDURE — 10907ZC DRAINAGE OF AMNIOTIC FLUID, THERAPEUTIC FROM PRODUCTS OF CONCEPTION, VIA NATURAL OR ARTIFICIAL OPENING: ICD-10-PCS | Performed by: OBSTETRICS & GYNECOLOGY

## 2022-02-26 PROCEDURE — 74011000250 HC RX REV CODE- 250: Performed by: ANESTHESIOLOGY

## 2022-02-26 PROCEDURE — 4A1HXCZ MONITORING OF PRODUCTS OF CONCEPTION, CARDIAC RATE, EXTERNAL APPROACH: ICD-10-PCS | Performed by: OBSTETRICS & GYNECOLOGY

## 2022-02-26 PROCEDURE — 36415 COLL VENOUS BLD VENIPUNCTURE: CPT

## 2022-02-26 PROCEDURE — 74011250636 HC RX REV CODE- 250/636: Performed by: OBSTETRICS & GYNECOLOGY

## 2022-02-26 PROCEDURE — 74011250637 HC RX REV CODE- 250/637: Performed by: OBSTETRICS & GYNECOLOGY

## 2022-02-26 PROCEDURE — 59025 FETAL NON-STRESS TEST: CPT

## 2022-02-26 PROCEDURE — 87635 SARS-COV-2 COVID-19 AMP PRB: CPT

## 2022-02-26 PROCEDURE — 85025 COMPLETE CBC W/AUTO DIFF WBC: CPT

## 2022-02-26 PROCEDURE — 74011000258 HC RX REV CODE- 258: Performed by: OBSTETRICS & GYNECOLOGY

## 2022-02-26 PROCEDURE — 77030014125 HC TY EPDRL BBMI -B: Performed by: ANESTHESIOLOGY

## 2022-02-26 PROCEDURE — 80053 COMPREHEN METABOLIC PANEL: CPT

## 2022-02-26 PROCEDURE — 76815 OB US LIMITED FETUS(S): CPT

## 2022-02-26 PROCEDURE — 3E033VJ INTRODUCTION OF OTHER HORMONE INTO PERIPHERAL VEIN, PERCUTANEOUS APPROACH: ICD-10-PCS | Performed by: OBSTETRICS & GYNECOLOGY

## 2022-02-26 PROCEDURE — 80307 DRUG TEST PRSMV CHEM ANLYZR: CPT

## 2022-02-26 PROCEDURE — 99284 EMERGENCY DEPT VISIT MOD MDM: CPT

## 2022-02-26 PROCEDURE — 74011000250 HC RX REV CODE- 250

## 2022-02-26 PROCEDURE — 75410000002 HC LABOR FEE PER 1 HR

## 2022-02-26 PROCEDURE — 74011000250 HC RX REV CODE- 250: Performed by: OBSTETRICS & GYNECOLOGY

## 2022-02-26 PROCEDURE — 77030003666 HC NDL SPINAL BD -A: Performed by: ANESTHESIOLOGY

## 2022-02-26 PROCEDURE — 76060000078 HC EPIDURAL ANESTHESIA

## 2022-02-26 RX ORDER — FENTANYL/BUPIVACAINE/NS/PF 2-1250MCG
12 PREFILLED PUMP RESERVOIR EPIDURAL CONTINUOUS
Status: DISCONTINUED | OUTPATIENT
Start: 2022-02-26 | End: 2022-02-27

## 2022-02-26 RX ORDER — SODIUM CHLORIDE 0.9 % (FLUSH) 0.9 %
5-40 SYRINGE (ML) INJECTION EVERY 8 HOURS
Status: DISCONTINUED | OUTPATIENT
Start: 2022-02-26 | End: 2022-02-27 | Stop reason: HOSPADM

## 2022-02-26 RX ORDER — BACLOFEN 20 MG
TABLET ORAL
COMMUNITY

## 2022-02-26 RX ORDER — LEVOTHYROXINE SODIUM 50 UG/1
50 TABLET ORAL
Status: DISCONTINUED | OUTPATIENT
Start: 2022-02-27 | End: 2022-03-01 | Stop reason: HOSPADM

## 2022-02-26 RX ORDER — BUPIVACAINE HYDROCHLORIDE 2.5 MG/ML
INJECTION, SOLUTION EPIDURAL; INFILTRATION; INTRACAUDAL AS NEEDED
Status: DISCONTINUED | OUTPATIENT
Start: 2022-02-26 | End: 2022-02-27 | Stop reason: HOSPADM

## 2022-02-26 RX ORDER — OXYTOCIN/RINGER'S LACTATE 30/500 ML
10 PLASTIC BAG, INJECTION (ML) INTRAVENOUS AS NEEDED
Status: COMPLETED | OUTPATIENT
Start: 2022-02-26 | End: 2022-02-27

## 2022-02-26 RX ORDER — SODIUM CHLORIDE, SODIUM LACTATE, POTASSIUM CHLORIDE, CALCIUM CHLORIDE 600; 310; 30; 20 MG/100ML; MG/100ML; MG/100ML; MG/100ML
100 INJECTION, SOLUTION INTRAVENOUS CONTINUOUS
Status: DISCONTINUED | OUTPATIENT
Start: 2022-02-26 | End: 2022-02-27

## 2022-02-26 RX ORDER — BUTORPHANOL TARTRATE 2 MG/ML
1 INJECTION INTRAMUSCULAR; INTRAVENOUS
Status: DISCONTINUED | OUTPATIENT
Start: 2022-02-26 | End: 2022-03-01 | Stop reason: HOSPADM

## 2022-02-26 RX ORDER — ACETAMINOPHEN 325 MG/1
650 TABLET ORAL
Status: DISCONTINUED | OUTPATIENT
Start: 2022-02-26 | End: 2022-02-27 | Stop reason: HOSPADM

## 2022-02-26 RX ORDER — ONDANSETRON 2 MG/ML
4 INJECTION INTRAMUSCULAR; INTRAVENOUS
Status: DISCONTINUED | OUTPATIENT
Start: 2022-02-26 | End: 2022-02-27 | Stop reason: HOSPADM

## 2022-02-26 RX ORDER — SODIUM CHLORIDE 0.9 % (FLUSH) 0.9 %
5-40 SYRINGE (ML) INJECTION AS NEEDED
Status: DISCONTINUED | OUTPATIENT
Start: 2022-02-26 | End: 2022-03-01 | Stop reason: HOSPADM

## 2022-02-26 RX ORDER — BUPIVACAINE HYDROCHLORIDE AND EPINEPHRINE 2.5; 5 MG/ML; UG/ML
INJECTION, SOLUTION EPIDURAL; INFILTRATION; INTRACAUDAL; PERINEURAL
Status: COMPLETED
Start: 2022-02-26 | End: 2022-02-26

## 2022-02-26 RX ORDER — SUMATRIPTAN 100 MG/1
100 TABLET, FILM COATED ORAL
COMMUNITY
End: 2022-03-01

## 2022-02-26 RX ORDER — OXYTOCIN/RINGER'S LACTATE 30/500 ML
87.3 PLASTIC BAG, INJECTION (ML) INTRAVENOUS AS NEEDED
Status: DISCONTINUED | OUTPATIENT
Start: 2022-02-26 | End: 2022-02-27

## 2022-02-26 RX ORDER — SODIUM CHLORIDE 0.9 % (FLUSH) 0.9 %
5-40 SYRINGE (ML) INJECTION EVERY 8 HOURS
Status: DISCONTINUED | OUTPATIENT
Start: 2022-02-26 | End: 2022-03-01 | Stop reason: HOSPADM

## 2022-02-26 RX ORDER — SODIUM CHLORIDE 0.9 % (FLUSH) 0.9 %
5-40 SYRINGE (ML) INJECTION AS NEEDED
Status: DISCONTINUED | OUTPATIENT
Start: 2022-02-26 | End: 2022-02-27 | Stop reason: HOSPADM

## 2022-02-26 RX ORDER — OXYTOCIN/RINGER'S LACTATE 30/500 ML
0-20 PLASTIC BAG, INJECTION (ML) INTRAVENOUS
Status: DISCONTINUED | OUTPATIENT
Start: 2022-02-26 | End: 2022-02-27

## 2022-02-26 RX ORDER — EPHEDRINE SULFATE/0.9% NACL/PF 50 MG/5 ML
12.5 SYRINGE (ML) INTRAVENOUS
Status: DISCONTINUED | OUTPATIENT
Start: 2022-02-26 | End: 2022-02-27 | Stop reason: HOSPADM

## 2022-02-26 RX ORDER — ACETAMINOPHEN 325 MG/1
325 TABLET ORAL
Status: DISCONTINUED | OUTPATIENT
Start: 2022-02-26 | End: 2022-03-01 | Stop reason: HOSPADM

## 2022-02-26 RX ORDER — FENTANYL/BUPIVACAINE/NS/PF 2-1250MCG
PREFILLED PUMP RESERVOIR EPIDURAL
Status: COMPLETED
Start: 2022-02-26 | End: 2022-02-26

## 2022-02-26 RX ORDER — BUPIVACAINE HYDROCHLORIDE AND EPINEPHRINE 2.5; 5 MG/ML; UG/ML
INJECTION, SOLUTION EPIDURAL; INFILTRATION; INTRACAUDAL; PERINEURAL AS NEEDED
Status: DISCONTINUED | OUTPATIENT
Start: 2022-02-26 | End: 2022-02-27 | Stop reason: HOSPADM

## 2022-02-26 RX ADMIN — OXYTOCIN 1 MILLI-UNITS/MIN: 10 INJECTION INTRAVENOUS at 17:09

## 2022-02-26 RX ADMIN — Medication 12 ML/HR: at 15:12

## 2022-02-26 RX ADMIN — SODIUM CHLORIDE, POTASSIUM CHLORIDE, SODIUM LACTATE AND CALCIUM CHLORIDE 125 ML/HR: 600; 310; 30; 20 INJECTION, SOLUTION INTRAVENOUS at 23:00

## 2022-02-26 RX ADMIN — CEFAZOLIN SODIUM 2 G: 1 INJECTION, POWDER, FOR SOLUTION INTRAMUSCULAR; INTRAVENOUS at 22:14

## 2022-02-26 RX ADMIN — ACETAMINOPHEN 325MG 650 MG: 325 TABLET ORAL at 13:25

## 2022-02-26 RX ADMIN — SODIUM CHLORIDE, POTASSIUM CHLORIDE, SODIUM LACTATE AND CALCIUM CHLORIDE 999 ML/HR: 600; 310; 30; 20 INJECTION, SOLUTION INTRAVENOUS at 14:34

## 2022-02-26 RX ADMIN — BUTORPHANOL TARTRATE 1 MG: 2 INJECTION, SOLUTION INTRAMUSCULAR; INTRAVENOUS at 13:52

## 2022-02-26 RX ADMIN — ACETAMINOPHEN 325MG 325 MG: 325 TABLET ORAL at 22:05

## 2022-02-26 RX ADMIN — SODIUM CHLORIDE, POTASSIUM CHLORIDE, SODIUM LACTATE AND CALCIUM CHLORIDE 1000 ML: 600; 310; 30; 20 INJECTION, SOLUTION INTRAVENOUS at 13:28

## 2022-02-26 RX ADMIN — ACETAMINOPHEN 325MG 650 MG: 325 TABLET ORAL at 20:38

## 2022-02-26 RX ADMIN — BUPIVACAINE HYDROCHLORIDE AND EPINEPHRINE 0.8 ML: 2.5; 5 INJECTION, SOLUTION EPIDURAL; INFILTRATION; INTRACAUDAL; PERINEURAL at 15:00

## 2022-02-26 RX ADMIN — BUPIVACAINE HYDROCHLORIDE 5 ML: 2.5 INJECTION, SOLUTION EPIDURAL; INFILTRATION; INTRACAUDAL; PERINEURAL at 15:03

## 2022-02-26 RX ADMIN — GENTAMICIN SULFATE 300 MG: 40 INJECTION, SOLUTION INTRAMUSCULAR; INTRAVENOUS at 22:56

## 2022-02-26 RX ADMIN — Medication 12 ML/HR: at 22:20

## 2022-02-26 RX ADMIN — SODIUM CHLORIDE, POTASSIUM CHLORIDE, SODIUM LACTATE AND CALCIUM CHLORIDE 125 ML/HR: 600; 310; 30; 20 INJECTION, SOLUTION INTRAVENOUS at 19:20

## 2022-02-26 NOTE — ANESTHESIA PREPROCEDURE EVALUATION
Relevant Problems   HEMATOLOGY   (+) Beta thalassemia minor      PERSONAL HX & FAMILY HX OF CANCER   (+) Melanoma of neck (HCC)       Anesthetic History   No history of anesthetic complications            Review of Systems / Medical History  Patient summary reviewed, nursing notes reviewed and pertinent labs reviewed    Pulmonary  Within defined limits                 Neuro/Psych   Within defined limits           Cardiovascular  Within defined limits                Exercise tolerance: >4 METS     GI/Hepatic/Renal           Liver disease ([place] syndrome)     Endo/Other      Hypothyroidism       Other Findings              Physical Exam    Airway  Mallampati: II  TM Distance: 4 - 6 cm  Neck ROM: normal range of motion   Mouth opening: Normal     Cardiovascular  Regular rate and rhythm,  S1 and S2 normal,  no murmur, click, rub, or gallop  Rhythm: regular  Rate: normal         Dental  No notable dental hx       Pulmonary  Breath sounds clear to auscultation               Abdominal  GI exam deferred       Other Findings            Anesthetic Plan    ASA: 2  Anesthesia type: CSE            Anesthetic plan and risks discussed with: Patient

## 2022-02-26 NOTE — ANESTHESIA PROCEDURE NOTES
CSE Block    Start time: 2/26/2022 2:53 PM  End time: 2/26/2022 3:03 PM  Performed by: Teresa Singletary MD  Authorized by: Teresa Singletary MD     Pre-Procedure  Indications: at surgeon's request    Indications comment:  Labor epidural  preanesthetic checklist: patient identified, risks and benefits discussed, anesthesia consent, site marked, patient being monitored and timeout performed      Procedure:   Patient Position:  Seated  Prep Region:  Lumbar  Prep: DuraPrep    Location:  L2-3    Epidural Needle:   Needle Type:  Tuohy  Needle Gauge:  17 G  Injection Technique:  Loss of resistance using saline  Attempts:  1    Spinal Needle:   Needle Type:   Cash  Needle Gauge:  25 G    Catheter:   Catheter Size:  19 G  Catheter at Skin Depth (cm):  10  Depth in Epidural Space (cm):  5  Events: no blood with aspiration, no paresthesia and CSF confirmed    Test Dose:  Negative    Assessment:   Catheter Secured:  Tegaderm and tape  Insertion:  Uncomplicated  Patient tolerance:  Patient tolerated the procedure well with no immediate complications

## 2022-02-26 NOTE — H&P
OB History & Physical    Name: Jordan Moe MRN: [id number]  SSN: [id number]    YOB: 1985  Age: 29 y.o. Sex: female      Subjective:     Chief complaint: contractions    History of Present Illness: Jordan Moe is a 29 y.o.  female with an estimated gestational age of 44w3d with Estimated Date of Delivery: 22. Patient complains of contractions that are about every 5 minutes. She was scheduled for induction of labor for Monday. She reports no vaginal bleeding or leaking of fluid. Baby has been moving well. Her medical history is significant for anemia, thyroid disorder and Evia Gobble. She is followed by Dr Lopez      OB History        1    Para        Term                AB        Living           SAB        IAB        Ectopic        Molar        Multiple        Live Births                  Past Medical History:   Diagnosis Date    Beta thalassemia minor     Gilbert syndrome     Melanoma of neck ([facility] [address] 2021    Thyroid disease      Past Surgical History:   Procedure Laterality Date    HX BREAST AUGMENTATION  2013    BREAST AUGMENTATION performed by Kristy Marie MD at [facility]    HX OTHER SURGICAL Left 2021    Excision Melanoma left neck @ [facility] by Dr. Marla Gonzalez Not on file   Tobacco Use    Smoking status: Never Smoker    Smokeless tobacco: Never Used   Vaping Use    Vaping Use: Never used   Substance and Sexual Activity    Alcohol use: Not Currently     Comment: PREGNANT    Drug use: No    Sexual activity: Yes     Family History   Problem Relation Age of Onset    Diabetes Mother     Cancer Father     Thyroid Disease Sister     No Known Problems Brother     Blindness Brother     Anesth Problems Neg Hx        No Known Allergies  Prior to Admission medications    Medication Sig Start Date End Date Taking?  Authorizing Provider   levothyroxine (SYNTHROID) 50 mcg tablet TAKE 1 TABLET BY MOUTH ONE TIME A DAY BEFORE BREAKFAST 12/15/21   Litzy Moore MD   prenatal vit-iron fumarate-fa 27 mg iron- 0.8 mg tab tablet Take 1 Tablet by mouth daily. Provider, Historical        Review of Systems    Objective:     Vitals: There were no vitals filed for this visit. No data recorded. No data recorded     Physical Exam  General: in NAD, in pain with contractions  HEENT: normocephalic  Abdomen: Gravid, soft, nontender Extremities: no abnormal cyanosis, clubbing, edema  Skin: warm, dry, no abnormal lesions noted  Pelvic:Cervical Exam: 2/80/-1, mid position  Uterine Activity: contractions detected every 10 minutes  Membranes: no gross evidence of ROM  Fetal Heart Rate: 140's adequate variability and reactivity; no significant abnormal decelerations    Labs: No results found for this or any previous visit (from the past 24 hour(s)). Patient Active Problem List   Diagnosis Code    Beta thalassemia minor D56.3    Gilbert syndrome E80.4    Melanoma of neck ([facility] [address] C43.4    Post-dates pregnancy O48.0    Normal labor O80, Z37.9     Assessment and Plan:     G1 @ 40wks presents with contractions, likely early labor    1. IUP- category 1    2. Early labor- vertex on US, GBS neg. Early labor, discussion of option of management. Will proceed with balloon. 3. [place] syndrome- CMP pending. Has had normal labs in pregnancy    4. Hypothyroid- on 50mcg of synthroid daily, has taken today    5. Beta Thalassemia minor, anemia- has seen hematology during this pregnancy. CBC pending.       Signed By:  Lizbet Morton MD     February 26, 2022

## 2022-02-26 NOTE — PROGRESS NOTES
1040: Patient arrived to L & D for c/o contractions, G1 patient of , GA 40w1d, denies LOF, VB, HA, blurred vision, reports +FM, last SVE 2cm in office on 2/23 per prenatal. Medical hx of beta thalassemia trait, hypothyroid, and Gilbert syndrome. NKA. 1045:  at bedside, vertex by US, SVE 2/80/-1, POC discussed to perform NST then address POC.    2214:  at bedside, patient agreeable to CRB, inserted, patient tolerated well, 60mL in each balloon. POC intermittent monitoring while balloon in place.

## 2022-02-26 NOTE — PROGRESS NOTES
Reviewed tracing  Category 1  Contractions about every 10 minutes  Discussed cervical ripening balloon with patient, intermittent monitoring as requested    Placed cervical ripening balloon without difficulty, 60mL/60mL in each.    Dicussed plan of care

## 2022-02-26 NOTE — PROGRESS NOTES
1218: Bedside shift change report given to BERNARD Gonzales (oncoming nurse) by PAUL Castellano, RN (offgoing nurse). Report included the following information SBAR.     1440: Anesthesia called for epidural placement    [id number]: Anesthesia at bedside, time out performed and patient position to side of the bed. Difficulty monitoring EFM and toco due to maternal position. Once procedure completed patient position back in bed with wedge to left hip.    1600: Dr Maritza Mayes at bedside to assess patient status. Estrada bulb fell out, SVE performed and AROM performed    1712: Dr Maritza Mayes at bedside to replace IUPC    1848: Dr Maritza Mayes at bedside to assess, SVE performed    1915: Bedside shift change report given to ARMOND Walton RN (oncoming nurse) by BERNARD Mazariegos RN (offgoing nurse). Report included the following information SBAR, Kardex and MAR.

## 2022-02-26 NOTE — PROGRESS NOTES
OB Progress note    S: no concerns, comfortable    O:  Visit Vitals  BP (!) 112/58   Pulse 88   Temp 98.6 °F (37 °C)   Resp 18   Ht 5' 6\" (1.676 m)   Wt 74.8 kg (165 lb)   LMP 05/21/2021 (Exact Date)   SpO2 100%   BMI 26.63 kg/m²     FHT- 130's moderate variability, occasional early decelerations, no other decelerations  [place]- contractions q 3 min  SVE: 5/80/-2      A/P: 30 y/o G1 @ 40w5d with early labor    1. IUP- category 1    2. Labor- continue to increase pitocin as fetus will tolerate. GBS neg. MVU are not adequate yet.

## 2022-02-26 NOTE — PROGRESS NOTES
OB Progress note    S: no concerns, comfortable with epidural    O:  Visit Vitals  /67   Pulse 99   Resp 18   Ht 5' 6\" (1.676 m)   Wt 74.8 kg (165 lb)   LMP 05/21/2021 (Exact Date)   SpO2 100%   BMI 26.63 kg/m²     FHT- 130's moderate variability, accelerations present  [place]- contractions, difficult to see pattern, IUPC placed  SVE: 4/80/-2    A/P: 30 y/o G1 @ 40w5d for early labor and augmentation    1. IUP- category 1    2. Early labor- s/p AROM- clear, pitocin orders written for if needed for augmentation. IUPC placed to evaluate contraction pattern better.  GBS neg

## 2022-02-27 PROCEDURE — 74011250637 HC RX REV CODE- 250/637: Performed by: OBSTETRICS & GYNECOLOGY

## 2022-02-27 PROCEDURE — 74011250636 HC RX REV CODE- 250/636

## 2022-02-27 PROCEDURE — 74011250636 HC RX REV CODE- 250/636: Performed by: OBSTETRICS & GYNECOLOGY

## 2022-02-27 PROCEDURE — 75410000003 HC RECOV DEL/VAG/CSECN EA 0.5 HR

## 2022-02-27 PROCEDURE — 74011000250 HC RX REV CODE- 250: Performed by: OBSTETRICS & GYNECOLOGY

## 2022-02-27 PROCEDURE — 75410000000 HC DELIVERY VAGINAL/SINGLE

## 2022-02-27 PROCEDURE — 0KQM0ZZ REPAIR PERINEUM MUSCLE, OPEN APPROACH: ICD-10-PCS | Performed by: OBSTETRICS & GYNECOLOGY

## 2022-02-27 PROCEDURE — 65410000002 HC RM PRIVATE OB

## 2022-02-27 PROCEDURE — 75410000002 HC LABOR FEE PER 1 HR

## 2022-02-27 PROCEDURE — 74011000250 HC RX REV CODE- 250

## 2022-02-27 RX ORDER — SODIUM CHLORIDE, SODIUM LACTATE, POTASSIUM CHLORIDE, CALCIUM CHLORIDE 600; 310; 30; 20 MG/100ML; MG/100ML; MG/100ML; MG/100ML
125 INJECTION, SOLUTION INTRAVENOUS CONTINUOUS
Status: DISCONTINUED | OUTPATIENT
Start: 2022-02-27 | End: 2022-03-01 | Stop reason: HOSPADM

## 2022-02-27 RX ORDER — ZOLPIDEM TARTRATE 5 MG/1
5 TABLET ORAL
Status: DISCONTINUED | OUTPATIENT
Start: 2022-02-27 | End: 2022-03-01 | Stop reason: HOSPADM

## 2022-02-27 RX ORDER — TRANEXAMIC ACID 100 MG/ML
INJECTION, SOLUTION INTRAVENOUS
Status: COMPLETED
Start: 2022-02-27 | End: 2022-02-27

## 2022-02-27 RX ORDER — TRANEXAMIC ACID 100 MG/ML
1 INJECTION, SOLUTION INTRAVENOUS ONCE
Status: COMPLETED | OUTPATIENT
Start: 2022-02-27 | End: 2022-02-27

## 2022-02-27 RX ORDER — OXYTOCIN/RINGER'S LACTATE 30/500 ML
87.3 PLASTIC BAG, INJECTION (ML) INTRAVENOUS AS NEEDED
Status: DISCONTINUED | OUTPATIENT
Start: 2022-02-27 | End: 2022-03-01 | Stop reason: HOSPADM

## 2022-02-27 RX ORDER — IBUPROFEN 400 MG/1
800 TABLET ORAL EVERY 8 HOURS
Status: DISCONTINUED | OUTPATIENT
Start: 2022-02-27 | End: 2022-03-01 | Stop reason: HOSPADM

## 2022-02-27 RX ORDER — OXYTOCIN/RINGER'S LACTATE 30/500 ML
10 PLASTIC BAG, INJECTION (ML) INTRAVENOUS AS NEEDED
Status: DISCONTINUED | OUTPATIENT
Start: 2022-02-27 | End: 2022-03-01 | Stop reason: HOSPADM

## 2022-02-27 RX ORDER — NALOXONE HYDROCHLORIDE 0.4 MG/ML
0.4 INJECTION, SOLUTION INTRAMUSCULAR; INTRAVENOUS; SUBCUTANEOUS AS NEEDED
Status: DISCONTINUED | OUTPATIENT
Start: 2022-02-27 | End: 2022-03-01 | Stop reason: HOSPADM

## 2022-02-27 RX ORDER — MISOPROSTOL 200 UG/1
800 TABLET ORAL ONCE
Status: COMPLETED | OUTPATIENT
Start: 2022-02-27 | End: 2022-02-27

## 2022-02-27 RX ORDER — HYDROCORTISONE ACETATE PRAMOXINE HCL 2.5; 1 G/100G; G/100G
CREAM TOPICAL AS NEEDED
Status: DISCONTINUED | OUTPATIENT
Start: 2022-02-27 | End: 2022-03-01 | Stop reason: HOSPADM

## 2022-02-27 RX ORDER — OXYCODONE AND ACETAMINOPHEN 5; 325 MG/1; MG/1
1 TABLET ORAL
Status: DISCONTINUED | OUTPATIENT
Start: 2022-02-27 | End: 2022-03-01 | Stop reason: HOSPADM

## 2022-02-27 RX ORDER — METHYLERGONOVINE MALEATE 0.2 MG/ML
INJECTION INTRAVENOUS
Status: COMPLETED
Start: 2022-02-27 | End: 2022-02-27

## 2022-02-27 RX ORDER — METHYLERGONOVINE MALEATE 0.2 MG/ML
0.2 INJECTION INTRAVENOUS ONCE
Status: COMPLETED | OUTPATIENT
Start: 2022-02-27 | End: 2022-02-27

## 2022-02-27 RX ORDER — ACETAMINOPHEN 325 MG/1
650 TABLET ORAL
Status: DISCONTINUED | OUTPATIENT
Start: 2022-02-27 | End: 2022-03-01 | Stop reason: HOSPADM

## 2022-02-27 RX ADMIN — METHYLERGONOVINE MALEATE 0.2 MG: 0.2 INJECTION INTRAVENOUS at 02:38

## 2022-02-27 RX ADMIN — IBUPROFEN 800 MG: 400 TABLET, FILM COATED ORAL at 06:28

## 2022-02-27 RX ADMIN — MISOPROSTOL 800 MCG: 200 TABLET ORAL at 02:28

## 2022-02-27 RX ADMIN — WATER 2 G: 1 INJECTION INTRAMUSCULAR; INTRAVENOUS; SUBCUTANEOUS at 21:42

## 2022-02-27 RX ADMIN — ACETAMINOPHEN 325MG 650 MG: 325 TABLET ORAL at 05:46

## 2022-02-27 RX ADMIN — WATER 2 G: 1 INJECTION INTRAMUSCULAR; INTRAVENOUS; SUBCUTANEOUS at 05:49

## 2022-02-27 RX ADMIN — TRANEXAMIC ACID 1000 MG: 100 INJECTION, SOLUTION INTRAVENOUS at 02:32

## 2022-02-27 RX ADMIN — LEVOTHYROXINE SODIUM 50 MCG: 0.05 TABLET ORAL at 06:28

## 2022-02-27 RX ADMIN — ACETAMINOPHEN 325MG 650 MG: 325 TABLET ORAL at 21:43

## 2022-02-27 RX ADMIN — ACETAMINOPHEN 325MG 325 MG: 325 TABLET ORAL at 14:33

## 2022-02-27 RX ADMIN — TRANEXAMIC ACID 1000 MG: 1 INJECTION, SOLUTION INTRAVENOUS at 02:32

## 2022-02-27 RX ADMIN — IBUPROFEN 800 MG: 400 TABLET, FILM COATED ORAL at 21:42

## 2022-02-27 RX ADMIN — ONDANSETRON 4 MG: 2 INJECTION INTRAMUSCULAR; INTRAVENOUS at 03:02

## 2022-02-27 RX ADMIN — METHYLERGONOVINE MALEATE 0.2 MG: 0.2 INJECTION, SOLUTION INTRAMUSCULAR; INTRAVENOUS at 02:38

## 2022-02-27 RX ADMIN — SODIUM CHLORIDE, PRESERVATIVE FREE 10 ML: 5 INJECTION INTRAVENOUS at 05:52

## 2022-02-27 RX ADMIN — OXYTOCIN 10000 MILLI-UNITS: 10 INJECTION INTRAVENOUS at 02:23

## 2022-02-27 RX ADMIN — IBUPROFEN 800 MG: 400 TABLET, FILM COATED ORAL at 14:33

## 2022-02-27 RX ADMIN — WATER 2 G: 1 INJECTION INTRAMUSCULAR; INTRAVENOUS; SUBCUTANEOUS at 14:34

## 2022-02-27 NOTE — L&D DELIVERY NOTE
Delivery Summary  Patient: Jefry Boyce             Circumcision:   NA-female \"Carie\"  Additional Delivery Comments - G1 presented at 40 5/7 weeks in early labor. CRB placed and when it fell out several hours later,  AROM performed and Pitocin begun. Patient progressed to fully dilated and pushed over about 1.5 hours to deliver over intact perineum. A loose nuchal cord was reduced and shoulders and body delivered easily. Baby was noted to have meconium. Baby was placed on maternal abdomen and after 2 minute delay cord was double clamped and cut by FOB. Placenta delivered spontaneously intact 3 minutes later. Pitocin was added to IVFs. A second degree laceration was repaired with 3-0 Vicryl in standard fashion. There was a persistent slow trickle of vaginal bleeding. On bimanual several clots were evacuated. There were no retained placental fragements on exam. Patient was given 800 mcg of Cytotec MA, Methergine 0.2 mg IM and 1gm of TXA IV. Uterus became firm and there was minimal bleeding noted. Mom and baby were doing well. Information for the patient's :  Brunilda Miguelina [[id number]]     Delivery Type: Vaginal, Spontaneous   Delivery Date: 2022   Delivery Time: 2:19 AM     Birth Weight: 3.72 kg     Sex:  female  Delivery Clinician:  Reece Wong   Gestational Age: 38w9d    Presentation: Vertex   Position:    Occiput  Anterior     Apgars were 9  and 9      Resuscitation Method: Suctioning-bulb; Tactile Stimulation     Meconium Stained: Thick    Living Status: Living       Placenta Date/Time: 2022  2:23 AM   Placenta Removal: Spontaneous   Placenta Appearance: Intact    Cord Information: 3 Vessels    Cord Events: Nuchal Cord With Compressions       Disposition of Cord Blood: Discard    Blood Gases Sent?:  No       Cord pH:  none    Episiotomy: None   Laceration(s): 2nd     Estimated Blood Loss (ml): No data found    Labor Events  Method: Estrada Bulb (balloon); Oxytocin Augmentation: None   Cervical Ripenin2022  11:45 AM  Estrada/BETTY        Operative Vaginal Delivery - none

## 2022-02-27 NOTE — ROUTINE PROCESS
Bedside shift change report given to KATHRYN Mills RN (oncoming nurse) by Papa Thomas (offgoing nurse). Report included the following information SBAR, Intake/Output and MAR.

## 2022-02-27 NOTE — PROGRESS NOTES
02/26/22 2115   Cervical Exam   Dilation (cm) 6   Eff 80 %   Station +1   Baby Position Vertex   G1 at 40 5/7 wks presented in early labor s/p CRB/AROM and Pitocin for augmentation  -continue Pitocin per unit protocol  -CEFM  -T=100.5- she was under many blankets and room warm- will remove blankets and repeat temp in 30 minutes- if still elevated would start antibiotics for triple I

## 2022-02-27 NOTE — PROGRESS NOTES
7:15 PM  Bedside shift change report given to Gio Faulkner RN (oncoming nurse) by Tawnya Wetzel RN (offgoing nurse). Report included the following information SBAR, Kardex, Intake/Output, MAR and Recent Results. 9:15 PM  SVE performed by Dr Kelsie Slater, /.    12:30 AM  Pt complete. 12:34 AM  Pt began pushing. 2:19 AM   of live female infant with Dr Kelsie Slater. Nuchal x 1, reduced. 2:40 AM  Recovery started. Pt received Cytotec, TXA, and Methergine for heavy bleeding. Bleeding controlled now.

## 2022-02-27 NOTE — PROGRESS NOTES
7:10 AM  Bedside shift change report given to LONNY Bland RN (oncoming nurse) by Jose Elias Feliciano RN (offgoing nurse). Report included the following information SBAR, Kardex, Intake/Output, MAR and Recent Results.

## 2022-02-28 PROCEDURE — 74011250637 HC RX REV CODE- 250/637: Performed by: OBSTETRICS & GYNECOLOGY

## 2022-02-28 PROCEDURE — 65410000002 HC RM PRIVATE OB

## 2022-02-28 RX ADMIN — ACETAMINOPHEN 325MG 650 MG: 325 TABLET ORAL at 20:08

## 2022-02-28 RX ADMIN — LEVOTHYROXINE SODIUM 50 MCG: 0.05 TABLET ORAL at 06:24

## 2022-02-28 RX ADMIN — IBUPROFEN 800 MG: 400 TABLET, FILM COATED ORAL at 15:20

## 2022-02-28 RX ADMIN — ACETAMINOPHEN 325MG 650 MG: 325 TABLET ORAL at 15:20

## 2022-02-28 RX ADMIN — IBUPROFEN 800 MG: 400 TABLET, FILM COATED ORAL at 06:24

## 2022-02-28 RX ADMIN — IBUPROFEN 800 MG: 400 TABLET, FILM COATED ORAL at 23:36

## 2022-02-28 NOTE — PROGRESS NOTES
Post-Partum Day Number 1 Progress Note    Tanana Scot     Assessment: Doing well, post partum day 1  PP atony managed with meds. Beta Thal minor, chronic anemia  Baby under bili lights    Plan:  - Continue routine postpartum and perineal care as well as maternal education.  - N/A   - Plan discharge home [address] Discharge Date: Tomorrow. Information for the patient's :  López Guardian [[id number]]   Vaginal, Spontaneous    Patient doing well without significant complaint. Voiding without difficulty, normal lochia. Vitals:  Visit Vitals  /69 (BP 1 Location: Left upper arm, BP Patient Position: At rest)   Pulse 65   Temp 98.1 °F (36.7 °C)   Resp 18   Ht 5' 6\" (1.676 m)   Wt 74.8 kg (165 lb)   LMP 2021 (Exact Date)   SpO2 98%   Breastfeeding Unknown   BMI 26.63 kg/m²     Temp (24hrs), Av.2 °F (36.8 °C), Min:97.7 °F (36.5 °C), Max:99.3 °F (37.4 °C)        Exam:   Patient without distress. Fundus firm, nontender per nursing fundal checks. Perineum with normal lochia noted per nursing assessment. Lower extremities are negative for pathological edema. Labs:     Lab Results   Component Value Date/Time    WBC 16.6 (H) 2022 11:15 AM    WBC 6.5 2020 08:59 AM    HGB 9.7 (L) 2022 11:15 AM    HGB 10.3 (L) 2020 08:59 AM    HCT 32.7 (L) 2022 11:15 AM    HCT 33.2 (L) 2020 08:59 AM    PLATELET 996  11:15 AM    PLATELET 179  08:59 AM       No results found for this or any previous visit (from the past 24 hour(s)).

## 2022-03-01 VITALS
OXYGEN SATURATION: 100 % | BODY MASS INDEX: 26.52 KG/M2 | HEART RATE: 74 BPM | WEIGHT: 165 LBS | HEIGHT: 66 IN | RESPIRATION RATE: 16 BRPM | TEMPERATURE: 97.5 F | SYSTOLIC BLOOD PRESSURE: 120 MMHG | DIASTOLIC BLOOD PRESSURE: 78 MMHG

## 2022-03-01 PROCEDURE — 74011250637 HC RX REV CODE- 250/637: Performed by: OBSTETRICS & GYNECOLOGY

## 2022-03-01 RX ADMIN — LEVOTHYROXINE SODIUM 50 MCG: 0.05 TABLET ORAL at 07:27

## 2022-03-01 RX ADMIN — ACETAMINOPHEN 325MG 650 MG: 325 TABLET ORAL at 03:15

## 2022-03-01 RX ADMIN — IBUPROFEN 800 MG: 400 TABLET, FILM COATED ORAL at 07:28

## 2022-03-01 NOTE — DISCHARGE SUMMARY
Obstetrical Discharge Summary     Name: Honorio Vásquez MRN: [id number]  SSN: [id number]    YOB: 1985  Age: 29 y.o. Sex: female      Admit Date: 2022    Discharge Date: 3/1/2022     Admitting Physician: Ellie Lopez MD     Attending Physician:  Nisa Sepulveda MD     Admission Diagnoses: Post-dates pregnancy [O48.0]  Normal labor [O80, Z37.9]    Discharge Diagnoses:   Information for the patient's :  Millie Alonso [[id number]]   Delivery of a 3.72 kg female infant via Vaginal, Spontaneous on 2022 at 2:19 AM  by Claudia Johnson. Apgars were 9  and 9 . Additional Diagnoses:   Hospital Problems  Date Reviewed: 2021          Codes Class Noted POA    Post-dates pregnancy ICD-10-CM: O48.0  ICD-9-CM: 645.10  2022 Unknown        Normal labor ICD-10-CM: O80, Z37.9  ICD-9-CM: 945  2022 Unknown             Lab Results   Component Value Date/Time    Rubella, External Immune 2021 12:00 AM    GrBStrep, External Negative 2022 12:00 AM       Hospital Course:  p term labor, c/b PPH and triple I. Normal hospital course following the delivery. Discharged PPD2 with normal lochia and afebrile x 48 hours. Patient Instructions:   Current Discharge Medication List      CONTINUE these medications which have NOT CHANGED    Details   magnesium oxide 500 mg tab Take  by mouth.      levothyroxine (SYNTHROID) 50 mcg tablet TAKE 1 TABLET BY MOUTH ONE TIME A DAY BEFORE BREAKFAST  Qty: 90 Tablet, Refills: 0      prenatal vit-iron fumarate-fa 27 mg iron- 0.8 mg tab tablet Take 1 Tablet by mouth daily. STOP taking these medications       SUMAtriptan (Imitrex) 100 mg tablet Comments:   Reason for Stopping:               Disposition at Discharge: Home or self care    Condition at Discharge: Stable    Reference my discharge instructions.     Follow-up Appointments   Procedures    FOLLOW UP VISIT Appointment in: 6 Weeks     Standing Status:   Standing     Number of Occurrences:   1     Order Specific Question:   Appointment in     Answer:   6 Weeks        Signed By:  Stacey Bravo MD     March 1, 2022

## 2022-03-01 NOTE — PROGRESS NOTES
Post-Partum Day Number 2 Progress Note    Trevon Tillman     Assessment: Doing well, post partum day 2 from  p term labor. C/b PPH and Triple I. Now normal lochia and afebrile x 48 hours. Plan:   - Discharge home today  - Follow up in office in 6 week(s) with [facility]. - Questions answered. Information for the patient's :  Matilde Foreman [[id number]]   Vaginal, Spontaneous      Patient doing well without significant complaint. Voiding without difficulty, normal lochia. Ambulating, tolerating a diet. Ready for discharge home. Vitals:  Visit Vitals  /78   Pulse 74   Temp 97.5 °F (36.4 °C)   Resp 16   Ht 5' 6\" (1.676 m)   Wt 74.8 kg (165 lb)   LMP 2021 (Exact Date)   SpO2 100%   Breastfeeding Unknown   BMI 26.63 kg/m²     Temp (24hrs), Av.9 °F (36.6 °C), Min:97.5 °F (36.4 °C), Max:98.1 °F (36.7 °C)      Exam:        Patient without distress. Fundus firm, nontender per nursing fundal checks                Perineum with normal lochia noted per nursing assessment                Lower extremities are negative for pathological edema    Labs:     No results found for this or any previous visit (from the past 24 hour(s)).

## 2022-03-01 NOTE — DISCHARGE INSTRUCTIONS
Patient Education        After Your Delivery (the Postpartum Period): Care Instructions  Your Care Instructions     Congratulations on the birth of your baby. Like pregnancy, the  period can be a time of excitement, deniz, and exhaustion. You may look at your wondrous little baby and feel happy. You may also be overwhelmed by your new sleep hours and new responsibilities. At first, babies often sleep during the days and are awake at night. They do not have a pattern or routine. They may make sudden gasps, jerk themselves awake, or look like they have crossed eyes. These are all normal, and they may even make you smile. In these first weeks after delivery, try to take good care of yourself. It may take 4 to 6 weeks to feel like yourself again, and possibly longer if you had a  birth. You will likely feel very tired for several weeks. Your days will be full of ups and downs, but lots of deniz as well. Follow-up care is a key part of your treatment and safety. Be sure to make and go to all appointments, and call your doctor if you are having problems. It's also a good idea to know your test results and keep a list of the medicines you take. How can you care for yourself at home? Take care of your body after delivery  · Use pads instead of tampons for the bloody flow that may last as long as 2 weeks. · Ease cramps with ibuprofen (Advil, Motrin). · Ease soreness of hemorrhoids and the area between your vagina and rectum with ice compresses or witch hazel pads. · Ease constipation by drinking lots of fluid and eating high-fiber foods. Ask your doctor about over-the-counter stool softeners. · Cleanse yourself with a gentle squeeze of warm water from a bottle instead of wiping with toilet paper. · Take a sitz bath in warm water several times a day. · Wear a good nursing bra. Ease sore and swollen breasts with warm, wet washcloths.   · If you aren't breastfeeding, use ice rather than heat for breast soreness. · Your period may not start for several months if you are breastfeeding. You may bleed more, and longer at first, than you did before you got pregnant. · Wait until you are healed (about 4 to 6 weeks) before you have sex. Ask your doctor when it is okay for you to have sex. · Try not to travel with your baby for 5 or 6 weeks. If you take a long car trip, make frequent stops to walk around and stretch. Avoid exhaustion  · Rest every day. Try to nap when your baby naps. · Ask another adult to be with you for a few days after delivery. · Plan for  if you have other children. · Stay flexible so you can eat at odd hours and sleep when you need to. Both you and your baby are making new schedules. · Plan small trips to get out of the house. Change can make you feel less tired. · Ask for help with housework, cooking, and shopping. Remind yourself that your job is to care for your baby. Know about help for postpartum depression  · \"Baby blues\" are common for the first 1 to 2 weeks after birth. You may cry or feel sad or irritable for no reason. · Rest whenever you can. Being tired makes it harder to handle your emotions. · Go for walks with your baby. · Talk to your partner, friends, and family about your feelings. · If your symptoms last for more than a few weeks, or if you feel very depressed, ask your doctor for help. · Postpartum depression can be treated. Support groups and counseling can help. Sometimes medicine can also help. Stay healthy  · Eat healthy foods so you have more energy. · If you breastfeed, avoid drugs. If you quit smoking during pregnancy, try to stay smoke-free. If you choose to have a drink now and then, have only one drink, and limit the number of occasions that you have a drink. Wait to breastfeed at least 2 hours after you have a drink to reduce the amount of alcohol the baby may get in the milk.   · Start daily exercise after 4 to 6 weeks, but rest when you feel tired.  · Learn exercises to tone your belly. Do Kegel exercises to regain strength in your pelvic muscles. You can do these exercises while you stand or sit. ? Squeeze the same muscles you would use to stop your urine. Your belly and thighs should not move. ? Hold the squeeze for 3 seconds, and then relax for 3 seconds. ? Start with 3 seconds. Then add 1 second each week until you are able to squeeze for 10 seconds. ? Repeat the exercise 10 to 15 times for each session. Do three or more sessions each day. · Find a class for you and your baby that has an exercise time. · If you had a  birth, give yourself a bit more time before you exercise, and be careful. When should you call for help? Call 911  anytime you think you may need emergency care. For example, call if:    · You have thoughts of harming yourself, your baby, or another person.     · You passed out (lost consciousness).     · You have chest pain, are short of breath, or cough up blood.     · You have a seizure. Call your doctor now or seek immediate medical care if:    · You have severe vaginal bleeding. This means you are passing blood clots and soaking through a pad each hour for 2 or more hours.     · You are dizzy or lightheaded, or you feel like you may faint.     · You have a fever.     · You have new or more belly pain.     · You have signs of a blood clot in your leg (called a deep vein thrombosis), such as:  ? Pain in the calf, back of the knee, thigh, or groin. ? Redness and swelling in your leg or groin.     · You have signs of preeclampsia, such as:  ? Sudden swelling of your face, hands, or feet. ? New vision problems (such as dimness, blurring, or seeing spots). ? A severe headache.    Watch closely for changes in your health, and be sure to contact your doctor if:    · Your vaginal bleeding seems to be getting heavier.     · You have new or worse vaginal discharge.     · You feel sad, anxious, or hopeless for more than a few days.     · You do not get better as expected. Where can you learn more? Go to http://www.[employer].com/  Enter A461 in the search box to learn more about \"After Your Delivery (the Postpartum Period): Care Instructions. \"  Current as of: June 16, 2021               Content Version: 13.0  © 5403-9194 [employer]. Care instructions adapted under license by [employer] (which disclaims liability or warranty for this information). If you have questions about a medical condition or this instruction, always ask your healthcare professional. [address] any warranty or liability for your use of this information.

## 2022-03-02 ENCOUNTER — PATIENT OUTREACH (OUTPATIENT)
Dept: OTHER | Age: 37
End: 2022-03-02

## 2022-03-03 ENCOUNTER — TELEPHONE (OUTPATIENT)
Dept: ONCOLOGY | Age: 37
End: 2022-03-03

## 2022-03-03 ENCOUNTER — PATIENT OUTREACH (OUTPATIENT)
Dept: OTHER | Age: 37
End: 2022-03-03

## 2022-03-03 DIAGNOSIS — C43.4 MALIGNANT MELANOMA OF NECK (HCC): Primary | ICD-10-CM

## 2022-03-03 NOTE — PATIENT INSTRUCTIONS
Breastfeeding: Care Instructions  Overview     Breastfeeding has many benefits. It may lower your baby's chances of getting an infection. It also may make it less likely that your baby will have problems such as diabetes and obesity later in life. Breastfeeding also helps you bond with your baby. In the first days after birth, your breasts make a thick, yellow liquid called colostrum. This liquid gives your baby nutrients and antibodies against infection. It is all that babies need in the first days after birth. Your breasts will fill with milk a few days after the birth. Breastfeeding is a skill that gets better with practice. Be patient with yourself and your baby. If you have trouble, you can get help and keep breastfeeding. Follow-up care is a key part of your treatment and safety. Be sure to make and go to all appointments, and call your doctor if you are having problems. It's also a good idea to know your test results and keep a list of the medicines you take. How can you care for yourself at home? · Breastfeed your baby whenever your baby is hungry. In the first 2 weeks, your baby will breastfeed at least 8 times in a 24-hour period. This will help you keep up your supply of milk. Signs that your baby is hungry include:  ? Sucking on their hands. ? D Lo their lips. ? Turning their head toward your breast.  · Put a bed pillow or a nursing pillow on your lap to support your arms and your baby. · Hold your baby in a comfortable position. ? You can hold your baby in several ways. One of the most common positions is the cradle hold. One arm supports your baby, with your baby's head in the bend of your elbow. Your open hand supports your baby's bottom or back. Your baby's belly lies against yours. ? If you had your baby by , or , try the football hold. This position keeps your baby off your belly.  Tuck your baby under your arm, with your baby's body along the side you will be feeding on. Support your baby's upper body with your arm. With that hand you can control your baby's head to bring their mouth to your breast.  ? Try different positions with each feeding. If you are having problems, ask for help from your doctor or a lactation consultant. · To get your baby to latch on:  ? Support and narrow your breast with one hand using a \"U hold,\" with your thumb on the outer side of your breast and your fingers on the inner side. You can also use a \"C hold,\" with all your fingers below the nipple and your thumb above it. Try the different holds to get the deepest latch for whichever breastfeeding position you use. Your other arm is behind your baby's back, with your hand supporting the base of the baby's head. Position your fingers and thumb to point toward your baby's ears. ? You can touch your baby's lower lip with your nipple to get your baby's mouth to open. Wait until your baby opens up really wide, like a big yawn. Then be sure to bring the baby quickly to your breastnot your breast to the baby. As you bring your baby toward your breast, use your other hand to support the breast and guide it into your baby's mouth. ? Both the nipple and a large portion of the darker area around the nipple (areola) should be in the baby's mouth. The baby's lips should be flared outward, not folded in (inverted). ? Listen for a regular sucking and swallowing pattern while the baby is feeding. If you can't see or hear a swallowing pattern, watch the baby's ears. They will wiggle slightly when the baby swallows. If the baby's nose appears to be blocked by your breast, bring your baby's body closer to you. This will help tilt the baby's head back slightly, so just the edge of one nostril is clear for breathing. ? When your baby is latched, you can usually remove your hand from supporting your breast and use it to cradle under your baby. Now just relax and breastfeed your baby.   · You will know that your baby is feeding well when:  ? Your baby's mouth covers a lot of the areola, and the lips are flared out. ? Your baby's chin and nose rest against your breast.  ? Sucking is deep and rhythmic, with short pauses. ? You are able to see and hear your baby swallowing. ? You do not feel pain in your nipple. · Offer both breasts to your baby at each feeding. Each time you breastfeed, switch which breast you start with. · Anytime you need to remove your baby from the breast, put one finger in the corner of your baby's mouth. Push your finger between your baby's gums to gently break the seal. If you don't break the tight seal before you remove your baby, your nipples can become sore, cracked, or bruised. · After you finish feeding, gently pat your baby's back to let out any swallowed air. After your baby burps, offer the breast again, or offer the other breast. Sometimes a baby will want to keep feeding after being burped. When should you call for help? Call your doctor now or seek immediate medical care if:    · You have symptoms of a breast infection, such as:  ? Increased pain, swelling, redness, or warmth around a breast.  ? Red streaks extending from the breast.  ? Pus draining from a breast.  ? A fever.     · Your baby has no wet diapers for 6 hours. Watch closely for changes in your health, and be sure to contact your doctor if:    · Your baby has trouble latching on to your breast.     · You continue to have pain or discomfort when breastfeeding.     · You have other questions or concerns. Where can you learn more? Go to http://www.gray.com/  Enter P492 in the search box to learn more about \"Breastfeeding: Care Instructions. \"  Current as of: June 16, 2021               Content Version: 13.0  © 3799-0486 Healthwise, [employer]. Care instructions adapted under license by [employer] (which disclaims liability or warranty for this information).  If you have questions about a medical condition or this instruction, always ask your healthcare professional. [address] any warranty or liability for your use of this information. Nutrition for Breastfeeding: Care Instructions  Overview     If you are breastfeeding, your doctor may suggest that you eat more calories each day than otherwise recommended for a person of your height and weight. Breastfeeding helps build the bond between you and your baby. It gives your baby excellent health benefits. A healthy diet includes eating a variety of foods from the basic food groups: grains, vegetables, fruits, milk and milk products (such as cheese and yogurt), and meat and dried beans. Eating well during breastfeeding will ensure that you stay healthy. Follow-up care is a key part of your treatment and safety. Be sure to make and go to all appointments, and call your doctor if you are having problems. It's also a good idea to know your test results and keep a list of the medicines you take. How can you care for yourself at home? Making good choices about what you eat and drink when you are breastfeeding can help you stay healthy. It can also help your baby stay healthy. Here are some things you can do. Eat a variety of healthy foods. This includes vegetables, fruits, milk products, whole grains, and protein. Drink plenty of fluids. Make water your first choice. If you have kidney, heart, or liver disease and have to limit fluids, talk with your doctor before you increase your fluids. Avoid fish with high mercury. This includes shark, swordfish, abi mackerel, and marlin. It also includes orange roughy, bigeye tuna, and tilefish from the [facility]. Instead, eat fish that is low in mercury. Choose canned light tuna, salmon, pollock, catfish, or shrimp. Limit caffeine. Things like coffee, tea, chocolate, and some sodas can contain caffeine. Caffeine can pass through breast milk to your baby. It may cause fussiness and sleep problems. Talk to your doctor about how much caffeine is safe for you. Limit alcohol. Alcohol can pass through breast milk to your baby. Talk to your doctor if you have questions about drinking alcohol while breastfeeding. Be safe with supplements. Talk with your doctor before taking any vitamins, minerals, and herbal or other dietary supplements. When should you call for help? Watch closely for changes in your health, and be sure to contact your doctor if you have any problems. Where can you learn more? Go to http://www.gray.com/  Enter P234 in the search box to learn more about \"Nutrition for Breastfeeding: Care Instructions. \"  Current as of: 2020               Content Version: 13.0  © 2246-4788 [employer]. Care instructions adapted under license by [employer] (which disclaims liability or warranty for this information). If you have questions about a medical condition or this instruction, always ask your healthcare professional. [place] [address] any warranty or liability for your use of this information. After Your Delivery (the Postpartum Period): Care Instructions  Your Care Instructions     Congratulations on the birth of your baby. Like pregnancy, the  period can be a time of excitement, deniz, and exhaustion. You may look at your wondrous little baby and feel happy. You may also be overwhelmed by your new sleep hours and new responsibilities. At first, babies often sleep during the days and are awake at night. They do not have a pattern or routine. They may make sudden gasps, jerk themselves awake, or look like they have crossed eyes. These are all normal, and they may even make you smile. In these first weeks after delivery, try to take good care of yourself. It may take 4 to 6 weeks to feel like yourself again, and possibly longer if you had a  birth.  You will likely feel very tired for several weeks. Your days will be full of ups and downs, but lots of deniz as well. Follow-up care is a key part of your treatment and safety. Be sure to make and go to all appointments, and call your doctor if you are having problems. It's also a good idea to know your test results and keep a list of the medicines you take. How can you care for yourself at home? Take care of your body after delivery  · Use pads instead of tampons for the bloody flow that may last as long as 2 weeks. · Ease cramps with ibuprofen (Advil, Motrin). · Ease soreness of hemorrhoids and the area between your vagina and rectum with ice compresses or witch hazel pads. · Ease constipation by drinking lots of fluid and eating high-fiber foods. Ask your doctor about over-the-counter stool softeners. · Cleanse yourself with a gentle squeeze of warm water from a bottle instead of wiping with toilet paper. · Take a sitz bath in warm water several times a day. · Wear a good nursing bra. Ease sore and swollen breasts with warm, wet washcloths. · If you aren't breastfeeding, use ice rather than heat for breast soreness. · Your period may not start for several months if you are breastfeeding. You may bleed more, and longer at first, than you did before you got pregnant. · Wait until you are healed (about 4 to 6 weeks) before you have sex. Ask your doctor when it is okay for you to have sex. · Try not to travel with your baby for 5 or 6 weeks. If you take a long car trip, make frequent stops to walk around and stretch. Avoid exhaustion  · Rest every day. Try to nap when your baby naps. · Ask another adult to be with you for a few days after delivery. · Plan for  if you have other children. · Stay flexible so you can eat at odd hours and sleep when you need to. Both you and your baby are making new schedules. · Plan small trips to get out of the house. Change can make you feel less tired.   · Ask for help with housework, cooking, and shopping. Remind yourself that your job is to care for your baby. Know about help for postpartum depression  · \"Baby blues\" are common for the first 1 to 2 weeks after birth. You may cry or feel sad or irritable for no reason. · Rest whenever you can. Being tired makes it harder to handle your emotions. · Go for walks with your baby. · Talk to your partner, friends, and family about your feelings. · If your symptoms last for more than a few weeks, or if you feel very depressed, ask your doctor for help. · Postpartum depression can be treated. Support groups and counseling can help. Sometimes medicine can also help. Stay healthy  · Eat healthy foods so you have more energy. · If you breastfeed, avoid drugs. If you quit smoking during pregnancy, try to stay smoke-free. If you choose to have a drink now and then, have only one drink, and limit the number of occasions that you have a drink. Wait to breastfeed at least 2 hours after you have a drink to reduce the amount of alcohol the baby may get in the milk. · Start daily exercise after 4 to 6 weeks, but rest when you feel tired. · Learn exercises to tone your belly. Do Kegel exercises to regain strength in your pelvic muscles. You can do these exercises while you stand or sit. ? Squeeze the same muscles you would use to stop your urine. Your belly and thighs should not move. ? Hold the squeeze for 3 seconds, and then relax for 3 seconds. ? Start with 3 seconds. Then add 1 second each week until you are able to squeeze for 10 seconds. ? Repeat the exercise 10 to 15 times for each session. Do three or more sessions each day. · Find a class for you and your baby that has an exercise time. · If you had a  birth, give yourself a bit more time before you exercise, and be careful. When should you call for help? Call 911  anytime you think you may need emergency care.  For example, call if:    · You have thoughts of harming yourself, your baby, or another person.     · You passed out (lost consciousness).     · You have chest pain, are short of breath, or cough up blood.     · You have a seizure. Call your doctor now or seek immediate medical care if:    · You have severe vaginal bleeding. This means you are passing blood clots and soaking through a pad each hour for 2 or more hours.     · You are dizzy or lightheaded, or you feel like you may faint.     · You have a fever.     · You have new or more belly pain.     · You have signs of a blood clot in your leg (called a deep vein thrombosis), such as:  ? Pain in the calf, back of the knee, thigh, or groin. ? Redness and swelling in your leg or groin.     · You have signs of preeclampsia, such as:  ? Sudden swelling of your face, hands, or feet. ? New vision problems (such as dimness, blurring, or seeing spots). ? A severe headache. Watch closely for changes in your health, and be sure to contact your doctor if:    · Your vaginal bleeding seems to be getting heavier.     · You have new or worse vaginal discharge.     · You feel sad, anxious, or hopeless for more than a few days.     · You do not get better as expected. Where can you learn more? Go to http://www.acosta.com/  Enter A461 in the search box to learn more about \"After Your Delivery (the Postpartum Period): Care Instructions. \"  Current as of: 2021               Content Version: 13.0   [employer]ike. Care instructions adapted under license by [employer] (which disclaims liability or warranty for this information). If you have questions about a medical condition or this instruction, always ask your healthcare professional. [place] [address] any warranty or liability for your use of this information.          Your [place] at Home: Care Instructions  Your Care Instructions     During your baby's first few weeks, you will spend most of your time feeding, diapering, and comforting your baby. You may feel overwhelmed at times. It is normal to wonder if you know what you are doing, especially if you are first-time parents. [place] care gets easier with every day. Soon you will know what each cry means and be able to figure out what your baby needs and wants. Follow-up care is a key part of your child's treatment and safety. Be sure to make and go to all appointments, and call your doctor if your child is having problems. It's also a good idea to know your child's test results and keep a list of the medicines your child takes. How can you care for your child at home? Feeding  · Feed your baby on demand. This means that you should breastfeed or bottle-feed your baby whenever they seem hungry. Do not set a schedule. · During the first 2 weeks, your baby will breastfeed at least 8 times in a 24-hour period. Formula-fed babies may need fewer feedings, at least 6 every 24 hours. · These early feedings often are short. Sometimes, a  nurses or drinks from a bottle only for a few minutes. Feedings gradually will last longer. · You may have to wake your sleepy baby to feed in the first few days after birth. Sleeping  · Always put your baby to sleep on their back, not the stomach. This lowers the risk of sudden infant death syndrome (SIDS). · Most babies sleep for about 18 hours each day. They wake for a short time at least every 2 to 3 hours. · Newborns have some moments of active sleep. The baby may make sounds or seem restless. This happens about every 50 to 60 minutes and usually lasts a few minutes. · At first, your baby may sleep through loud noises. Later, noises may wake your baby. · When your  wakes up, they usually will be hungry and will need to be fed. Diaper changing and bowel habits  · Try to check your baby's diaper at least every 2 hours. If it needs to be changed, do it as soon as you can.  That will help prevent diaper rash. · Your 's wet and soiled diapers can give you clues about your baby's health. Babies can become dehydrated if they're not getting enough breast milk or formula or if they lose fluid because of diarrhea, vomiting, or a fever. · For the first few days, your baby may have about 3 wet diapers a day. After that, expect 6 or more wet diapers a day throughout the first month of life. It can be hard to tell when a diaper is wet if you use disposable diapers. If you can't tell, put a piece of tissue in the diaper. It will be wet when your baby urinates. · Keep track of what bowel habits are normal or usual for your child. Umbilical cord care  · Keep your baby's diaper folded below the stump. If that doesn't work well, before you put the diaper on your baby, cut out a small area near the top of the diaper to keep the cord open to air. · To keep the cord dry, give your baby a sponge bath instead of bathing your baby in a tub or sink. The stump should fall off within a week or two. When should you call for help? Call your baby's doctor now or seek immediate medical care if:    · Your baby has a rectal temperature that is less than 97.5°F (36.4°C) or is 100.4°F (38°C) or higher. Call if you cannot take your baby's temperature but he or she seems hot.     · Your baby has no wet diapers for 6 hours.     · Your baby's skin or whites of the eyes gets a brighter or deeper yellow.     · You see pus or red skin on or around the umbilical cord stump. These are signs of infection. Watch closely for changes in your child's health, and be sure to contact your doctor if:    · Your baby is not having regular bowel movements based on his or her age.     · Your baby cries in an unusual way or for an unusual length of time.     · Your baby is rarely awake and does not wake up for feedings, is very fussy, seems too tired to eat, or is not interested in eating. Where can you learn more?   Go to http://www.gray.com/  Enter [id number] in the search box to learn more about \"Your [place] at Home: Care Instructions. \"  Current as of: February 10, 2021               Content Version: 13.0  © 7588-0468 Healthwise, [facility]. Care instructions adapted under license by [employer] (which disclaims liability or warranty for this information). If you have questions about a medical condition or this instruction, always ask your healthcare professional. [address] any warranty or liability for your use of this information.

## 2022-03-03 NOTE — TELEPHONE ENCOUNTER
Patient is scheduled for PET - orders are skull to thigh but technologist said it needs to be whole body scan due to Melanoma diagnosis

## 2022-03-03 NOTE — PROGRESS NOTES
Patient eligible for [employer] [facility] Manager contacted the patient by telephone to discuss the maternity management program.  Patient agrees to care management services at this time. Verified name and  with patient as identifiers. Call within 2 business days of discharge: Yes     Last Discharge [address]       Complaint Diagnosis Description Type Department Provider    22 Contractions  Admission (Discharged) Sharifa Valdez MD          Was this an external facility discharge? 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 Yes    Discussed follow up appointments. If no appointment was previously scheduled, appointment scheduling offered: Yes  [facility] follow up appointment(s):   Future Appointments   Date Time Provider Mili Nunez   3/8/2022  8:00 AM [facility] RCR PET DOSE 1 [employer] YASMINE   3/8/2022  9:00 AM [facility] RCR PET 1 SRI ORELLANA YASMINE   3/16/2022 10:30 AM Rosaline Campbell MD [facility] BS AMB     [facility] needs to make this appointment    OB History:   OB History    Para Term  AB Living   2 1 1   1 1   SAB IAB Ectopic Molar Multiple Live Births   1       0 1      # Outcome Date GA Lbr Gurmeet/2nd Weight Sex Delivery Anes PTL Lv   2 Term 22 40w6d 08:28 / 01:49 8 lb 3.2 oz (3.72 kg) F Vag-Spont CSE N LOBITO      Complications: Chorioamnionitis   1 SAB 2021               40w6d    Medication reconciliation was performed with patient, who verbalizes understanding of administration of home medications. Advised obtaining a 90-day supply of all daily and as-needed medications. Barriers/Support system:  Spouse/parents inlaws  Return to work planning?  Yes    Postpartum Assessment:  Vaginal, Lochia-normal not saturating a pad in an hour, Fever No, BM? Yes , Decreased urinary output No, Perineal care-yes pt using tucks and dari bottle/ soreness to area no additional concerns she had a 2nd degree tear, Visual changes No, Calf Pain No, Headache No, Swelling No, Breast pain Yes, Depression or feelings of sadness or overwhelm-mood good per pt no concerns at this time, Breast feeding Yes, Feeding [id number] pt is also pumping to help milk supply come in, Sleep safety and Infant's weight no concerns infant gaining back weight well her next appt with [facility] is at 2 weeks. Discussed royce process and she did this on-line and placed a call out to . The baby will be added to the 's insurance and this process is started per mom. Spent some time doing teaching on breast feeding since pt was having some pain and she is also working with lactation with this and she is feeling some relief. No additional questions or concerns. Will continue to monitor. 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 Patient given an opportunity to ask questions and does not have any further questions or concerns at this time. Were discharge instructions available to patient? Yes. Reviewed appropriate site of care based on symptoms and resources available to patient including: Benefits related nurse triage line, When to call 911 and Condition related references. The patient agrees to contact the OB/Gyn office for questions related to their healthcare. Plan for follow-up call in 10-14 days based on severity of symptoms and risk factors.   Plan for next call: self management-pp care/  care normal  2nd degree tear, breast feeding, primip

## 2022-03-03 NOTE — TELEPHONE ENCOUNTER
Call returned to scheduling. Nurse spoke w/ Silvano Herrerao.  Nathalia Rivrea linked new and old PET scan

## 2022-03-08 ENCOUNTER — HOSPITAL ENCOUNTER (OUTPATIENT)
Dept: PET IMAGING | Age: 37
Discharge: HOME | End: 2022-03-08
Attending: STUDENT IN AN ORGANIZED HEALTH CARE EDUCATION/TRAINING PROGRAM
Payer: COMMERCIAL

## 2022-03-08 VITALS — BODY MASS INDEX: 23.3 KG/M2 | HEIGHT: 66 IN | WEIGHT: 145 LBS

## 2022-03-08 DIAGNOSIS — C43.4 MALIGNANT MELANOMA OF NECK (HCC): ICD-10-CM

## 2022-03-08 LAB
GLUCOSE BLD STRIP.AUTO-MCNC: 91 MG/DL (ref 65–117)
SERVICE CMNT-IMP: NORMAL

## 2022-03-08 PROCEDURE — A9552 F18 FDG: HCPCS

## 2022-03-08 RX ORDER — FLUDEOXYGLUCOSE F-18 200 MCI/ML
10 INJECTION INTRAVENOUS ONCE
Status: COMPLETED | OUTPATIENT
Start: 2022-03-08 | End: 2022-03-08

## 2022-03-08 RX ORDER — LEVOTHYROXINE SODIUM 50 UG/1
TABLET ORAL
Qty: 90 TABLET | Refills: 0 | Status: SHIPPED | OUTPATIENT
Start: 2022-03-08 | End: 2022-06-16

## 2022-03-08 RX ORDER — LEVOTHYROXINE SODIUM 50 UG/1
TABLET ORAL
Qty: 90 TABLET | Refills: 0 | Status: SHIPPED | OUTPATIENT
Start: 2022-03-08 | End: 2022-05-17

## 2022-03-08 RX ADMIN — FLUDEOXYGLUCOSE F-18 10 MILLICURIE: 200 INJECTION INTRAVENOUS at 07:59

## 2022-03-08 NOTE — TELEPHONE ENCOUNTER
Future Appointments:  Future Appointments   Date Time Provider Mili Nunez   3/16/2022 10:30 AM Corrie Brady MD [facility]/ORLY SARAH        Last Appointment With Me:  Visit date not found     Requested Prescriptions     Pending Prescriptions Disp Refills    levothyroxine (SYNTHROID) 50 mcg tablet [Pharmacy Med Name: LEVOTHYROXINE SODIUM 50MCG TABS] 90 Tablet 0     Sig: TAKE 1 TABLET BY MOUTH ONE TIME A DAY BEFORE BREAKFAST

## 2022-03-08 NOTE — TELEPHONE ENCOUNTER
----- Message from Christa Jarrell sent at 3/8/2022 11:21 AM EST -----  Regarding: Thyroid labs from [facility] office  Here are the most recent thyroid labs from my OB office. I do need a refill on my synthroid, will have the pharmacy send request today. Thanks!

## 2022-03-16 ENCOUNTER — OFFICE VISIT (OUTPATIENT)
Dept: ONCOLOGY | Age: 37
End: 2022-03-16
Payer: COMMERCIAL

## 2022-03-16 VITALS
TEMPERATURE: 98.7 F | OXYGEN SATURATION: 98 % | DIASTOLIC BLOOD PRESSURE: 81 MMHG | SYSTOLIC BLOOD PRESSURE: 117 MMHG | RESPIRATION RATE: 18 BRPM | HEIGHT: 66 IN | HEART RATE: 85 BPM | WEIGHT: 142 LBS | BODY MASS INDEX: 22.82 KG/M2

## 2022-03-16 DIAGNOSIS — C43.4 MELANOMA OF NECK (HCC): Primary | ICD-10-CM

## 2022-03-16 PROCEDURE — 99214 OFFICE O/P EST MOD 30 MIN: CPT | Performed by: STUDENT IN AN ORGANIZED HEALTH CARE EDUCATION/TRAINING PROGRAM

## 2022-03-16 NOTE — PROGRESS NOTES
Anjelica Rivera is a 29 y.o. female here today for Cutaneous Melanoma f/u and to review PET. Pt gave birth to a girl on 2/27/22. Chief Complaint   Patient presents with    Follow-up     Cutaneous Melanoma     1. Have you been to the ER, urgent care clinic since your last visit? Hospitalized since your last visit? Yes; Giving Birth    2. Have you seen or consulted any other health care providers outside of the [address] [place] [address] since your last visit? Include any pap smears or colon screening.  Yes; Dr. Nadege Mchugh ; hematologist d/t low Hgb and OBGYN

## 2022-03-16 NOTE — PROGRESS NOTES
[facility] [place] at [facility] [address]  W: [phone] F: [phone]      Reason for Visit:   Leonardo Bernstein is a 29 y.o. female who is seen in consultation at the request of Dr. Mony Hampton, Dr. Mala Maya for evaluation of cutaneous melanoma. Hematology / Oncology Treatment History:     Hematological/Oncological Diagnosis: Clinical Stage 1B, [id number], Cutaneous Melanoma    Date of Diagnosis: 6/15/21    Treatment course:   6/15/21: Shave Biopsy revealed a Breslow depth of 1.94 mm non-ulcerated, no vascular invasion, no micro satellites. Mitotic rate of 5/mm2.          6/29/21: Wide Local Excision -- Final pathology showed no evidence of any residual melanoma. [place] node biopsy could not be performed at that time. No clinical evidence of neck lymphadenopathy      History of Present Illness:     Very pleasant 81-year-old with a past history most notable for hypothyroidism, currently pregnant at about 11 weeks, history of beta thalassemia minor, Gilbert syndrome, presents for evaluation and treatment of recently discovered cutaneous melanoma. The patient's clinical course began when she presented to her dermatologist for a mole check in May. She was noted to have a irritated mole in the left lateral neck base that was subsequently removed with shave biopsy by Dr. Wanda Leo as well on May 26, 2021. Subsequent pathology returned as a T2 a malignant melanoma with a Breslow thickness of 1.94 mm. There is no lipid present for several years though over the last 3 months, the patient has had some bleeding over that area. No other constitutional symptoms were reported at time of initial consultation. At time of initial consult, the patient was 11 weeks pregnant. No reported complications with the pregnancy. She denies any lymphadenopathy or any other palpable skin changes. No reported neurological changes.     Family history reviewed, notable for unspecified cancer in her father, diabetes in her mother. Social history reviewed, negative for significant alcohol or tobacco use. Positive ROS findings include: History of recent skin excision. No other pertinent positive findings. Review of Systems: A complete review of systems was obtained, negative except as described above. 11/16/21:  Patient has no new clinical symptoms since last evaluation. Interval neck ultrasound shows no lymphadenopathy. No there new clinical changes reported. The patient is 26 weeks pregnant, without complication. Interval History:     3/16/22:  Patient doing well, no residual lymphadenopathy reported. She is post pardum x 2 weeks. PET scan done last week shows no abnormal metabolic uptake. No areas of abnormalities over WLE scar. Overall doing very well.          Past Medical History:   Diagnosis Date    Abnormal Papanicolaou smear of cervix     Last abnormal 2010    Beta thalassemia minor     Gilbert syndrome     Liver disease     Gilbert Syndrome    Melanoma of neck ([facility] [address] 6/21/2021    Migraines     Thyroid disease       Past Surgical History:   Procedure Laterality Date    HX BREAST AUGMENTATION  5/13/2013    BREAST AUGMENTATION performed by Nicky Townsend MD at [facility]    HX OTHER SURGICAL Left 06/29/2021    Excision Melanoma left neck @ [facility] by Dr. Tomas Drilling      Augmentation 2013      Social History     Tobacco Use    Smoking status: Never Smoker    Smokeless tobacco: Never Used   Substance Use Topics    Alcohol use: Not Currently     Comment: PREGNANT      Family History   Problem Relation Age of Onset    Diabetes Mother     Cancer Father     Thyroid Disease Sister     No Known Problems Brother     Blindness Brother     Diabetes Maternal Grandmother     Heart Disease Maternal Grandfather     Hypertension Maternal Grandfather     Anesth Problems Neg Hx      Current Outpatient Medications   Medication Sig    levothyroxine (SYNTHROID) 50 mcg tablet TAKE 1 TABLET BY MOUTH ONE TIME A DAY BEFORE BREAKFAST    levothyroxine (SYNTHROID) 50 mcg tablet TAKE 1 TABLET BY MOUTH ONE TIME A DAY BEFORE BREAKFAST    magnesium oxide 500 mg tab Take  by mouth.  prenatal vit-iron fumarate-fa 27 mg iron- 0.8 mg tab tablet Take 1 Tablet by mouth daily. No current facility-administered medications for this visit. No Known Allergies     Physical Exam:     Visit Vitals  /81   Pulse 85   Temp 98.7 °F (37.1 °C)   Resp 18   Ht 5' 6\" (1.676 m)   Wt 142 lb (64.4 kg)   LMP 05/21/2021 (Exact Date)   SpO2 98%   BMI 22.92 kg/m²     ECOG PS: 0  General: alert, cooperative, no distress   Mental  status: normal mood, behavior, speech, dress, motor activity, and thought processes, able to follow commands   HENT: NCAT   Neck: No cervical, axillary, supraclavicular lymphadenopathy. WLE site is clean and healing nicely. Resp: no respiratory distress   Neuro: no gross deficits   Skin: no discoloration or lesions of concern on visible areas   Psychiatric: normal affect, consistent with stated mood, no evidence of hallucinations           Results:     Lab Results   Component Value Date/Time    WBC 16.6 (H) 02/26/2022 11:15 AM    HGB 9.7 (L) 02/26/2022 11:15 AM    HCT 32.7 (L) 02/26/2022 11:15 AM    PLATELET 823 12/69/2540 11:15 AM    MCV 68.3 (L) 02/26/2022 11:15 AM    ABS.  NEUTROPHILS 13.1 (H) 02/26/2022 11:15 AM     Lab Results   Component Value Date/Time    Sodium 137 02/26/2022 11:15 AM    Potassium 4.0 02/26/2022 11:15 AM    Chloride 108 02/26/2022 11:15 AM    CO2 21 02/26/2022 11:15 AM    Glucose 76 02/26/2022 11:15 AM    BUN 7 02/26/2022 11:15 AM    Creatinine 0.76 02/26/2022 11:15 AM    GFR est AA >60 02/26/2022 11:15 AM    GFR est non-AA >60 02/26/2022 11:15 AM    Calcium 9.6 02/26/2022 11:15 AM    Glucose (POC) 91 03/08/2022 07:58 AM     Lab Results Component Value Date/Time    Bilirubin, total 2.1 (H) 02/26/2022 11:15 AM    ALT (SGPT) 20 02/26/2022 11:15 AM    Alk. phosphatase 124 (H) 02/26/2022 11:15 AM    Protein, total 6.7 02/26/2022 11:15 AM    Albumin 2.9 (L) 02/26/2022 11:15 AM    Globulin 3.8 02/26/2022 11:15 AM         Assessment and Recommendations:      # Clinical Stage 1B, [id number], Cutaneous Melanoma  - [place] Lymph node could not be identified on surgical resection. No residual malignancy seen on wide local excision.   - initial diagnosis 6/15/2021    - Post pardum PET scan shows no evidence of increased metabolic activity. - plan for routine post WLE follow up with her dermatologist.     - plan for clinical follow up in 6 months for physical exam.     Patient in agreement with treatment course and plan. She was asked to call with any new or worsening symptoms or development of neck lymphadenopathy. The patient verbalized understanding and agreement.          Signed By: Kathrene Jeans, MD      Attending Medical Oncologist   [facility]

## 2022-03-18 PROBLEM — C43.4 MELANOMA OF NECK (HCC): Status: ACTIVE | Noted: 2021-06-21

## 2022-03-18 PROBLEM — O48.0 POST-DATES PREGNANCY: Status: ACTIVE | Noted: 2022-02-26

## 2022-03-19 PROBLEM — Z37.9 NORMAL LABOR: Status: ACTIVE | Noted: 2022-02-26

## 2022-05-03 ENCOUNTER — PATIENT OUTREACH (OUTPATIENT)
Dept: OTHER | Age: 37
End: 2022-05-03

## 2022-05-03 NOTE — PROGRESS NOTES
Care Transitions subsequent Follow Up Call      Last Discharge [address]       Complaint Diagnosis Description Type Department Provider    22 Contractions  Admission (Discharged) Isrrael Grant MD          Was this an external facility discharge? No Discharge Facility: [facility]      Maternity Care Manager contacted the patient by telephone for follow-up call. Verified name and  with patient as identifiers. Risk Factors Identified: hypothyroidism    Needs to be reviewed by the provider   none         Method of communication with provider : none      Advance Care Planning:   Does patient have an Advance Directive: not on file     Does patient have OB/Gyn Selected? Yes    Discussed follow up appointments. If no appointment was previously scheduled, appointment scheduling offered: Yes  [facility] follow up appointment(s):   Future Appointments   Date Time Provider Mili Nunez   2022  1:45 PM Estelle Calhoun NP [facility]     Non-[facility] follow up appointment(s): 6 week appointment    OB History:   OB History    Para Term  AB Living   2 1 1   1 1   SAB IAB Ectopic Molar Multiple Live Births   1       0 1      # Outcome Date GA Lbr Gurmeet/2nd Weight Sex Delivery Anes PTL Lv   2 Term 22 40w6d 08:28 / 01:49 8 lb 3.2 oz (3.72 kg) F Vag-Spont CSE N LOBITO      Complications: Chorioamnionitis   1 SAB 2021               Unknown    Medication reconciliation was performed with patient, who verbalizes understanding of administration of home medications. Advised obtaining a 90-day supply of all daily and as-needed medications. Barriers/Support system:  spouse   Return to work planning? Yes    Postpartum Assessment:  Vaginal delivery 22 baby girl doing well so far. Mom has f/u appointment with onc for neck meloma that was excised last year. There are no additional questions or concern. Mom is still breast feeding and is planning on returning back to work.    Resolving current episode. No further ED/UC or hospital admissions within 30 days post discharge. Patient attended follow-up appointments as directed. No outreach from patient to [address] [place] [address]. All goals met. Patient given an opportunity to ask questions and does not have any further questions or concerns at this time. Reviewed appropriate site of care based on symptoms and resources available to patient including: Benefits related nurse triage line and When to call 911. The patient agrees to contact the OB/Gyn office for questions related to their healthcare.

## 2022-05-17 ENCOUNTER — VIRTUAL VISIT (OUTPATIENT)
Dept: INTERNAL MEDICINE CLINIC | Age: 37
End: 2022-05-17
Payer: COMMERCIAL

## 2022-05-17 DIAGNOSIS — E03.9 ACQUIRED HYPOTHYROIDISM: Primary | ICD-10-CM

## 2022-05-17 DIAGNOSIS — C43.4 MELANOMA OF NECK (HCC): ICD-10-CM

## 2022-05-17 DIAGNOSIS — G43.909 MIGRAINE WITHOUT STATUS MIGRAINOSUS, NOT INTRACTABLE, UNSPECIFIED MIGRAINE TYPE: ICD-10-CM

## 2022-05-17 PROCEDURE — 99214 OFFICE O/P EST MOD 30 MIN: CPT | Performed by: INTERNAL MEDICINE

## 2022-05-17 RX ORDER — SUMATRIPTAN 100 MG/1
100 TABLET, FILM COATED ORAL AS NEEDED
Qty: 27 TABLET | Refills: 1 | Status: SHIPPED | OUTPATIENT
Start: 2022-05-17

## 2022-05-17 RX ORDER — SUMATRIPTAN 100 MG/1
1 TABLET, FILM COATED ORAL AS NEEDED
COMMUNITY
End: 2022-05-17 | Stop reason: SDUPTHER

## 2022-05-17 NOTE — PROGRESS NOTES
CC: Thyroid Problem and Medication Refill      HPI:    37 yo woman with a hx of [place] and thalassemia minor and hypothyroidism, migraines and malignant melanoma presenting to follow up         Hx of hypothyroidism currently on synthroid 50 mcg   Denies brittles naild hair loss  Had TSH checked during pregnancy  Frequently     Migraines - gone during second and trimester and now back she is currently taking Imitrex abortive therapy and magnesium daily    Thalassemia minor hx : mild anemia was  More pronounced during pregnancy      Follows regularly w/ derm for malignant melanoma of skin   First pregnancy   Delivered vaginal delivery   Induced    This is an established visit conducted via telemedicine with video. The patient has been instructed that this meets HIPAA criteria and acknowledges and agrees to this method of visitation. Pursuant to the emergency declaration under the [facility] waiver authority and the [employer] Act, this Virtual Visit was conducted, with patient's consent, to reduce the patient's risk of exposure to COVID-19 and provide continuity of care for an established patient. Services were provided through a video synchronous discussion virtually to substitute for in-person clinic visit.        ROS:  Constitutional: negative for fevers, chills, anorexia and weight loss  10 systems reviewed and negative other than HPI     Past Medical History:   Diagnosis Date    Abnormal Papanicolaou smear of cervix     Last abnormal 2010    Beta thalassemia minor     Gilbert syndrome     Liver disease     Gilbert Syndrome    Melanoma of neck ([facility] [address] 6/21/2021    Migraines     Thyroid disease        Current Outpatient Medications on File Prior to Visit   Medication Sig Dispense Refill    levothyroxine (SYNTHROID) 50 mcg tablet TAKE 1 TABLET BY MOUTH ONE TIME A DAY BEFORE BREAKFAST 90 Tablet 0    magnesium oxide 500 mg tab Take  by mouth.  prenatal vit-iron fumarate-fa 27 mg iron- 0.8 mg tab tablet Take 1 Tablet by mouth daily. No current facility-administered medications on file prior to visit.        Past Surgical History:   Procedure Laterality Date    HX BREAST AUGMENTATION  5/13/2013    BREAST AUGMENTATION performed by Martina Cervantes MD at [address] HX OTHER SURGICAL Left 06/29/2021    Excision Melanoma left neck @ [facility] by Dr. Jorden Avendano      Augmentation 2013       Family History   Problem Relation Age of Onset    Diabetes Mother     Cancer Father     Thyroid Disease Sister     No Known Problems Brother     Blindness Brother     Diabetes Maternal Grandmother     Heart Disease Maternal Grandfather     Hypertension Maternal Grandfather     Anesth Problems Neg Hx      Reviewed and no changes     Social History     Socioeconomic History    Marital status:      Spouse name: Dk Edwards Number of children: Not on file    Years of education: Not on file    Highest education level: Doctorate   Occupational History    Not on file   Tobacco Use    Smoking status: Never Smoker    Smokeless tobacco: Never Used   Vaping Use    Vaping Use: Never used   Substance and Sexual Activity    Alcohol use: Not Currently     Comment: PREGNANT    Drug use: No    Sexual activity: Yes   Other Topics Concern     Service No    Blood Transfusions No    Caffeine Concern No    Occupational Exposure No    Hobby Hazards No    Sleep Concern No    Stress Concern No    Weight Concern No    Special Diet No    Back Care No    Exercise No    Bike Helmet No    Seat Belt No    Self-Exams No   Social History Narrative    Not on file     Social Determinants of Health     Financial Resource Strain:     Difficulty of Paying Living Expenses: Not on file   Food Insecurity:     Worried About Running Out of Food in the Last Year: Not on file    Ran Out of Food in the Last Year: Not on file   Transportation Needs:     Lack of Transportation (Medical): Not on file    Lack of Transportation (Non-Medical): Not on file   Physical Activity:     Days of Exercise per Week: Not on file    Minutes of Exercise per Session: Not on file   Stress:     Feeling of Stress : Not on file   Social Connections:     Frequency of Communication with Friends and Family: Not on file    Frequency of Social Gatherings with Friends and Family: Not on file    Attends Worship Services: Not on file    Active Member of [address] [place] [address] or Organizations: Not on file    Attends Club or Organization Meetings: Not on file    Marital Status: Not on file   Intimate Partner Violence:     Fear of Current or Ex-Partner: Not on file    Emotionally Abused: Not on file    Physically Abused: Not on file    Sexually Abused: Not on file   Housing Stability:     Unable to Pay for Housing in the Last Year: Not on file    Number of [place] in the Last Year: Not on file    Unstable Housing in the Last Year: Not on file            There were no vitals taken for this visit. Physical Examination:   Gen: well appearing female  HEENT: normal conjunctiva, no audible congestion, patient does not see oral erythema, has MMM  Neck: patient does not feel enlarged or tender LAD or masses  Resp: normal respiratory effort, no audible wheezing. CV: patient does not feel palpitations or heart irregularity  Abd: patient does not feel abdominal tenderness or mass, patient does not notice distension  Extrem: patient does not see swelling in ankles or joints.    Neuro: Alert and oriented, able to answer questions without difficulty, able to move all extremities and walk normally          Lab Results   Component Value Date/Time    WBC 16.6 (H) 02/26/2022 11:15 AM    HGB 9.7 (L) 02/26/2022 11:15 AM    HCT 32.7 (L) 02/26/2022 11:15 AM    PLATELET 928 43/21/5042 11:15 AM    MCV 68.3 (L) 02/26/2022 11:15 AM     Lab Results   Component Value Date/Time    Sodium 137 02/26/2022 11:15 AM    Potassium 4.0 02/26/2022 11:15 AM    Chloride 108 02/26/2022 11:15 AM    CO2 21 02/26/2022 11:15 AM    Anion gap 8 02/26/2022 11:15 AM    Glucose 76 02/26/2022 11:15 AM    BUN 7 02/26/2022 11:15 AM    Creatinine 0.76 02/26/2022 11:15 AM    BUN/Creatinine ratio 9 (L) 02/26/2022 11:15 AM    GFR est AA >60 02/26/2022 11:15 AM    GFR est non-AA >60 02/26/2022 11:15 AM    Calcium 9.6 02/26/2022 11:15 AM     Lab Results   Component Value Date/Time    Cholesterol, total 101 07/15/2021 07:03 AM    HDL Cholesterol 39 07/15/2021 07:03 AM    LDL, calculated 49.4 07/15/2021 07:03 AM    VLDL, calculated 10 02/21/2020 08:59 AM    Triglyceride 63 07/15/2021 07:03 AM     Lab Results   Component Value Date/Time    TSH 0.57 10/01/2021 03:49 PM     No results found for: PSA, PSA2, PSAR1, PSA1, PSAR2, PSA3, PSAR3, JHG338235, [id number]  No results found for: HBA1C, TTJ8NDMO, YUJ8DUYG, ROB1HAEX  No results found for: VITD3, XQVID2, XQVID3, Dina Starcher, VD3RIA    Lab Results   Component Value Date/Time    ALT (SGPT) 20 02/26/2022 11:15 AM    Alk. phosphatase 124 (H) 02/26/2022 11:15 AM    Bilirubin, total 2.1 (H) 02/26/2022 11:15 AM           Assessment/Plan:        Acquired hypothyroidism   levothyroxine - currently on 50 mcg daily.   - TSH 3RD GENERATION; Future  - T4, FREE; Future  Will need refill after labs are back    Migraines: takes imitrex for abortive therapy  On magnesium to help prevent migraines     Melanoma: excised 2021/ follow up with dermatologist   Follow-up and Dispositions    · Return in about 6 months (around 11/17/2022).         Thalassemia minor stable    Orders Placed This Encounter    TSH 3RD GENERATION     Standing Status:   Future     Standing Expiration Date:   5/17/2023    T4, FREE     Standing Status:   Future     Standing Expiration Date:   5/17/2023    DISCONTD: SUMAtriptan (IMITREX) 100 mg tablet     Sig: Take 1 Tablet by mouth as needed.  SUMAtriptan (IMITREX) 100 mg tablet     Sig: Take 1 Tablet by mouth as needed for Migraine. Dispense:  27 Tablet     Refill:  1       Dari Wang MD    This is an established visit conducted via real time video and audio telemedicine. The patient has been instructed that this meets HIPAA criteria and acknowledges and agrees to this method of visitation.

## 2022-05-17 NOTE — PROGRESS NOTES
1. \"Have you been to the ER, urgent care clinic since your last visit? Hospitalized since your last visit? \" Yes Reason for visit: Feb. 26th [facility] for childbirth    2. \"Have you seen or consulted any other health care providers outside of the [address] [place] [address] since your last visit? \" Yes Reason for visit: [facility]     3. For patients aged 39-70: Has the patient had a colonoscopy / FIT/ Cologuard? NA - based on age      If the patient is female:    4. For patients aged 41-77: Has the patient had a mammogram within the past 2 years? NA - based on age or sex      11. For patients aged 21-65: Has the patient had a pap smear?  No

## 2022-05-18 ENCOUNTER — TELEPHONE (OUTPATIENT)
Dept: INTERNAL MEDICINE CLINIC | Age: 37
End: 2022-05-18

## 2022-05-18 NOTE — TELEPHONE ENCOUNTER
[address] Delivery needs a call back to get Instruction Clarification on prescription received for SUMAtriptan (IMITREX) 100 mg tablet. Please call to discuss & advise.  Thank you

## 2022-05-25 ENCOUNTER — LAB ONLY (OUTPATIENT)
Dept: INTERNAL MEDICINE CLINIC | Age: 37
End: 2022-05-25

## 2022-05-25 DIAGNOSIS — E03.9 ACQUIRED HYPOTHYROIDISM: ICD-10-CM

## 2022-05-26 LAB
T4 FREE SERPL-MCNC: 0.9 NG/DL (ref 0.8–1.5)
TSH SERPL DL<=0.05 MIU/L-ACNC: 0.3 UIU/ML (ref 0.36–3.74)

## 2022-06-05 NOTE — PROGRESS NOTES
Thyroid is slightly over supplemented recommend that you take medication levothyroxine 50 mcg 6 days a week ( skip one day a week)   Repeat level in 6-8 weeks

## 2022-06-16 LAB
CHOLEST SERPL-MCNC: 105 MG/DL
GLUCOSE SERPL-MCNC: 93 MG/DL (ref 65–100)
HDLC SERPL-MCNC: 44 MG/DL
LDLC SERPL CALC-MCNC: 47.8 MG/DL (ref 0–100)
TRIGL SERPL-MCNC: 66 MG/DL (ref ?–150)

## 2022-08-12 ENCOUNTER — TRANSCRIBE ORDER (OUTPATIENT)
Dept: SCHEDULING | Age: 37
End: 2022-08-12

## 2022-08-12 DIAGNOSIS — R59.9 ENLARGED LYMPH NODE: ICD-10-CM

## 2022-08-12 DIAGNOSIS — Z85.820 HISTORY OF MELANOMA: Primary | ICD-10-CM

## 2022-08-29 ENCOUNTER — HOSPITAL ENCOUNTER (OUTPATIENT)
Dept: ULTRASOUND IMAGING | Age: 37
Discharge: HOME | End: 2022-08-29
Attending: DERMATOLOGY
Payer: COMMERCIAL

## 2022-08-29 DIAGNOSIS — R59.9 ENLARGED LYMPH NODE: ICD-10-CM

## 2022-08-29 DIAGNOSIS — Z85.820 HISTORY OF MELANOMA: ICD-10-CM

## 2022-08-29 PROCEDURE — 76882 US LMTD JT/FCL EVL NVASC XTR: CPT

## 2022-09-15 ENCOUNTER — LAB ONLY (OUTPATIENT)
Dept: INTERNAL MEDICINE CLINIC | Age: 37
End: 2022-09-15

## 2022-09-15 DIAGNOSIS — E03.9 ACQUIRED HYPOTHYROIDISM: ICD-10-CM

## 2022-09-16 LAB
T4 FREE SERPL-MCNC: 1 NG/DL (ref 0.8–1.5)
TSH SERPL DL<=0.05 MIU/L-ACNC: 0.63 UIU/ML (ref 0.36–3.74)

## 2022-09-20 ENCOUNTER — OFFICE VISIT (OUTPATIENT)
Dept: ONCOLOGY | Age: 37
End: 2022-09-20
Payer: COMMERCIAL

## 2022-09-20 VITALS
BODY MASS INDEX: 21.69 KG/M2 | TEMPERATURE: 98.7 F | SYSTOLIC BLOOD PRESSURE: 106 MMHG | RESPIRATION RATE: 20 BRPM | OXYGEN SATURATION: 99 % | WEIGHT: 135 LBS | HEIGHT: 66 IN | HEART RATE: 79 BPM | DIASTOLIC BLOOD PRESSURE: 71 MMHG

## 2022-09-20 DIAGNOSIS — C43.4 MELANOMA OF NECK (HCC): Primary | ICD-10-CM

## 2022-09-20 DIAGNOSIS — D50.9 MICROCYTIC ANEMIA: ICD-10-CM

## 2022-09-20 PROCEDURE — 99215 OFFICE O/P EST HI 40 MIN: CPT | Performed by: STUDENT IN AN ORGANIZED HEALTH CARE EDUCATION/TRAINING PROGRAM

## 2022-09-20 NOTE — PROGRESS NOTES
Elida Bolaños is a 40 y.o. female pt here today for cutaneous melanoma. pt reports 0/10 pain today. Pt reports no new developments on body no new or existing changes in moles, no pigmented skin ,or irritating skin. Chief Complaint   Patient presents with    Follow-up     Cutaneous melanoma     1. Have you been to the ER, urgent care clinic since your last visit? Hospitalized since your last visit? No    2. Have you seen or consulted any other health care providers outside of the [address] [place] [address] since your last visit? Include any pap smears or colon screening.  No

## 2022-09-20 NOTE — PROGRESS NOTES
Cancer [place] at [address] [facility] [address]  W: [phone] F: [phone]      Reason for Visit:   Elida Bolaños is a 40 y.o. female who is seen in consultation at the request of Dr. Rosa Brantley, Dr. Jeri Patino for evaluation of cutaneous melanoma. Hematology / Oncology Treatment History:     Hematological/Oncological Diagnosis: Clinical Stage 1B, [id number], Cutaneous Melanoma    Date of Diagnosis: 6/15/21    Treatment course:   6/15/21: Shave Biopsy revealed a Breslow depth of 1.94 mm non-ulcerated, no vascular invasion, no micro satellites. Mitotic rate of 5/mm2.          6/29/21: Wide Local Excision -- Final pathology showed no evidence of any residual melanoma. [place] node biopsy could not be performed at that time. No clinical evidence of neck lymphadenopathy      History of Present Illness:     Very pleasant 81-year-old with a past history most notable for hypothyroidism, currently pregnant at about 11 weeks, history of beta thalassemia minor, Gilbert syndrome, presents for evaluation and treatment of recently discovered cutaneous melanoma. The patient's clinical course began when she presented to her dermatologist for a mole check in May. She was noted to have a irritated mole in the left lateral neck base that was subsequently removed with shave biopsy by Dr. Milka Graham as well on May 26, 2021. Subsequent pathology returned as a T2 a malignant melanoma with a Breslow thickness of 1.94 mm. There is no lipid present for several years though over the last 3 months, the patient has had some bleeding over that area. No other constitutional symptoms were reported at time of initial consultation. At time of initial consult, the patient was 11 weeks pregnant. No reported complications with the pregnancy. She denies any lymphadenopathy or any other palpable skin changes. No reported neurological changes.     Family history reviewed, notable for unspecified cancer in her father, diabetes in her mother. Social history reviewed, negative for significant alcohol or tobacco use. Positive ROS findings include: History of recent skin excision. No other pertinent positive findings. Review of Systems: A complete review of systems was obtained, negative except as described above. 11/16/21:  Patient has no new clinical symptoms since last evaluation. Interval neck ultrasound shows no lymphadenopathy. No there new clinical changes reported. The patient is 26 weeks pregnant, without complication. 3/16/22:  Patient doing well, no residual lymphadenopathy reported. She is post pardum x 2 weeks. PET scan done last week shows no abnormal metabolic uptake. No areas of abnormalities over WLE scar. Overall doing very well. Interval History:     9/20/22:  No new clinical worsening. Recent ultrasound reviewed, noted well demarcated isolated groin lymphadenopathy. No pain symptoms. No other complaints. No unintentional weight loss, increased fatigue, abdominal pain, or any other constitutional symptoms that would be concerning for malignancy.          Past Medical History:   Diagnosis Date    Abnormal Papanicolaou smear of cervix     Last abnormal 2010    Beta thalassemia minor     Gilbert syndrome     Liver disease     Gilbert Syndrome    Melanoma of neck ([facility] [address] 6/21/2021    Migraines     Thyroid disease       Past Surgical History:   Procedure Laterality Date    HX BREAST AUGMENTATION  5/13/2013    BREAST AUGMENTATION performed by Aracely Roman MD at [facility]    HX OTHER SURGICAL Left 06/29/2021    Excision Melanoma left neck @ [facility] by Dr. Lemond Cranker      Augmentation 2013      Social History     Tobacco Use    Smoking status: Never    Smokeless tobacco: Never   Substance Use Topics    Alcohol use: Not Currently     Comment: PREGNANT      Family History   Problem Relation Age of Onset    Diabetes Mother     Cancer Father     Thyroid Disease Sister     No Known Problems Brother     Blindness Brother     Diabetes Maternal Grandmother     Heart Disease Maternal Grandfather     Hypertension Maternal Grandfather     Anesth Problems Neg Hx      Current Outpatient Medications   Medication Sig    levothyroxine (SYNTHROID) 50 mcg tablet TAKE 1 TABLET BY MOUTH ONE TIME A DAY BEFORE BREAKFAST    SUMAtriptan (IMITREX) 100 mg tablet Take 1 Tablet by mouth as needed for Migraine. prenatal vit-iron fumarate-fa 27 mg iron- 0.8 mg tab tablet Take 1 Tablet by mouth daily. magnesium oxide 500 mg tab Take  by mouth. No current facility-administered medications for this visit. No Known Allergies     Physical Exam:     Visit Vitals  /71 (BP 1 Location: Right arm, BP Patient Position: Sitting)   Pulse 79   Temp 98.7 °F (37.1 °C) (Oral)   Resp 20   Ht 5' 6\" (1.676 m)   Wt 135 lb (61.2 kg)   SpO2 99%   Breastfeeding Yes   BMI 21.79 kg/m²     ECOG PS: 0  General: alert, cooperative, no distress   Mental  status: normal mood, behavior, speech, dress, motor activity, and thought processes, able to follow commands   HENT: NCAT   Neck: No cervical, axillary, supraclavicular lymphadenopathy. WLE site is clean and healing nicely. Resp: no respiratory distress   Neuro: no gross deficits   Skin: no discoloration or lesions of concern on visible areas   Psychiatric: normal affect, consistent with stated mood, no evidence of hallucinations           Results:     Lab Results   Component Value Date/Time    WBC 16.6 (H) 02/26/2022 11:15 AM    HGB 9.7 (L) 02/26/2022 11:15 AM    HCT 32.7 (L) 02/26/2022 11:15 AM    PLATELET 354 80/90/5307 11:15 AM    MCV 68.3 (L) 02/26/2022 11:15 AM    ABS.  NEUTROPHILS 13.1 (H) 02/26/2022 11:15 AM     Lab Results   Component Value Date/Time    Sodium 137 02/26/2022 11:15 AM    Potassium 4.0 02/26/2022 11:15 AM    Chloride 108 02/26/2022 11:15 AM    CO2 21 02/26/2022 11:15 AM    Glucose 93 06/16/2022 06:00 AM    BUN 7 02/26/2022 11:15 AM    Creatinine 0.76 02/26/2022 11:15 AM    GFR est AA >60 02/26/2022 11:15 AM    GFR est non-AA >60 02/26/2022 11:15 AM    Calcium 9.6 02/26/2022 11:15 AM    Glucose (POC) 91 03/08/2022 07:58 AM     Lab Results   Component Value Date/Time    Bilirubin, total 2.1 (H) 02/26/2022 11:15 AM    ALT (SGPT) 20 02/26/2022 11:15 AM    Alk. phosphatase 124 (H) 02/26/2022 11:15 AM    Protein, total 6.7 02/26/2022 11:15 AM    Albumin 2.9 (L) 02/26/2022 11:15 AM    Globulin 3.8 02/26/2022 11:15 AM     US Results (most recent):  Results from Hospital Encounter encounter on 08/29/22    US EXT NONVAS RT LTD    Narrative  EXAM: US EXT NONVAS RT LTD    INDICATION: Palpable right groin lymph node. History of melanoma. Dx: History of  melanoma Christel.Carlotta (ICD-10-CM)]; Enlarged lymph node [R59.9 (ICD-10-CM)]. COMPARISON: 3/20/2022 old bloody PET/CT. TECHNIQUE:  Real-time limited targeted sonography of the right groin was performed with  multiple static images obtained using both grayscale and color Doppler  techniques. FINDINGS:  Mildly prominent, sharply marginated lymph node measuring 1.0 x 0.8 x 0.5 cm  about the level of right common femoral vessels. No abnormal increased  vascularization on color Doppler. This was not FDG avid on prior PET/CT. Impression  Mildly prominent right inguinal lymph node, nonspecific. Attention  on follow-up is advised given reported history of melanoma. Assessment and Recommendations:      # Clinical Stage 1B, [id number], Cutaneous Melanoma  - [place] Lymph node could not be identified on surgical resection. No residual malignancy seen on wide local excision.   - initial diagnosis 6/15/2021  - Post pardum PET scan shows no evidence of increased metabolic activity.    - plan for routine post WLE follow up with her dermatologist.   - new mild painless lymphadenopathy in right groin, no other lymphadenopathy. No clinical symptoms.   - Repeat PET scan in March of 2023  - plan for clinical follow up in 6 months for physical exam and review of PET scan results. Patient in agreement with treatment course and plan. She was asked to call with any new or worsening symptoms or development of neck lymphadenopathy. The patient verbalized understanding and agreement. # Suzzanne Mccreedy Syndrome   - chronically elevated total bilirubin. # Microcytic anemia  - history of beta thalassemia minor  - will check iron stores.      Sydni New MD    Attending [address] [place] [address]

## 2022-09-21 RX ORDER — LEVOTHYROXINE SODIUM 50 UG/1
TABLET ORAL
Qty: 90 TABLET | Refills: 0 | Status: SHIPPED | OUTPATIENT
Start: 2022-09-21

## 2023-01-09 DIAGNOSIS — G43.909 MIGRAINE WITHOUT STATUS MIGRAINOSUS, NOT INTRACTABLE, UNSPECIFIED MIGRAINE TYPE: ICD-10-CM

## 2023-01-09 RX ORDER — SUMATRIPTAN 100 MG/1
TABLET, FILM COATED ORAL
Qty: 27 TABLET | Refills: 1 | Status: SHIPPED | OUTPATIENT
Start: 2023-01-09

## 2023-01-09 RX ORDER — LEVOTHYROXINE SODIUM 50 UG/1
TABLET ORAL
Qty: 90 TABLET | Refills: 0 | Status: SHIPPED | OUTPATIENT
Start: 2023-01-09

## 2023-02-23 ENCOUNTER — TELEPHONE (OUTPATIENT)
Dept: ONCOLOGY | Age: 38
End: 2023-02-23

## 2023-02-23 ENCOUNTER — TRANSCRIBE ORDER (OUTPATIENT)
Dept: SCHEDULING | Age: 38
End: 2023-02-23

## 2023-02-23 DIAGNOSIS — C43.4 MALIGNANT MELANOMA OF NECK (HCC): ICD-10-CM

## 2023-02-23 DIAGNOSIS — C43.4 MELANOMA OF NECK (HCC): Primary | ICD-10-CM

## 2023-02-23 NOTE — TELEPHONE ENCOUNTER
Please call scheduling at [phone] direct line to  the rep. She said the Pet Ct skull to thigh needs to be changed to Pet whole body subsequent because of the dx.

## 2023-03-20 ENCOUNTER — HOSPITAL ENCOUNTER (OUTPATIENT)
Dept: PET IMAGING | Age: 38
Discharge: HOME | End: 2023-03-20
Attending: STUDENT IN AN ORGANIZED HEALTH CARE EDUCATION/TRAINING PROGRAM
Payer: COMMERCIAL

## 2023-03-20 VITALS — HEIGHT: 66 IN | BODY MASS INDEX: 21.69 KG/M2 | WEIGHT: 135 LBS

## 2023-03-20 DIAGNOSIS — C43.4 MALIGNANT MELANOMA OF NECK (HCC): ICD-10-CM

## 2023-03-20 DIAGNOSIS — C43.4 MELANOMA OF NECK (HCC): ICD-10-CM

## 2023-03-20 LAB
GLUCOSE BLD STRIP.AUTO-MCNC: 96 MG/DL (ref 65–117)
SERVICE CMNT-IMP: NORMAL

## 2023-03-20 PROCEDURE — A9552 F18 FDG: HCPCS

## 2023-03-20 RX ORDER — FLUDEOXYGLUCOSE F-18 200 MCI/ML
10 INJECTION INTRAVENOUS ONCE
Status: COMPLETED | OUTPATIENT
Start: 2023-03-20 | End: 2023-03-20

## 2023-03-20 RX ADMIN — FLUDEOXYGLUCOSE F-18 10 MILLICURIE: 200 INJECTION INTRAVENOUS at 07:10

## 2023-03-21 ENCOUNTER — OFFICE VISIT (OUTPATIENT)
Dept: ONCOLOGY | Age: 38
End: 2023-03-21
Payer: COMMERCIAL

## 2023-03-21 VITALS
WEIGHT: 139 LBS | RESPIRATION RATE: 17 BRPM | HEIGHT: 66 IN | OXYGEN SATURATION: 98 % | DIASTOLIC BLOOD PRESSURE: 68 MMHG | HEART RATE: 63 BPM | SYSTOLIC BLOOD PRESSURE: 108 MMHG | BODY MASS INDEX: 22.34 KG/M2 | TEMPERATURE: 97.9 F

## 2023-03-21 DIAGNOSIS — C43.9 MALIGNANT MELANOMA, UNSPECIFIED SITE (HCC): Primary | ICD-10-CM

## 2023-03-21 PROCEDURE — 99213 OFFICE O/P EST LOW 20 MIN: CPT | Performed by: STUDENT IN AN ORGANIZED HEALTH CARE EDUCATION/TRAINING PROGRAM

## 2023-03-21 RX ORDER — TRIAMCINOLONE ACETONIDE 1 MG/G
OINTMENT TOPICAL
COMMUNITY
Start: 2023-01-17

## 2023-03-21 NOTE — PROGRESS NOTES
Cancer [place] at [address] [facility] [address]  W: [phone] F: [phone]      Reason for Visit:   Jayme Beard is a 40 y.o. female who is seen in consultation at the request of Dr. Marika Wadsworth, Dr. Laura Rojo for evaluation of cutaneous melanoma. Hematology / Oncology Treatment History:     Hematological/Oncological Diagnosis: Clinical Stage 1B, [id number], Cutaneous Melanoma    Date of Diagnosis: 6/15/21    Treatment course:   6/15/21: Shave Biopsy revealed a Breslow depth of 1.94 mm non-ulcerated, no vascular invasion, no micro satellites. Mitotic rate of 5/mm2.          6/29/21: Wide Local Excision -- Final pathology showed no evidence of any residual melanoma. [place] node biopsy could not be performed at that time. No clinical evidence of neck lymphadenopathy      History of Present Illness:     Very pleasant 81-year-old with a past history most notable for hypothyroidism, currently pregnant at about 11 weeks, history of beta thalassemia minor, Gilbert syndrome, presents for evaluation and treatment of recently discovered cutaneous melanoma. The patient's clinical course began when she presented to her dermatologist for a mole check in May. She was noted to have a irritated mole in the left lateral neck base that was subsequently removed with shave biopsy by Dr. Abhay Sierra as well on May 26, 2021. Subsequent pathology returned as a T2 a malignant melanoma with a Breslow thickness of 1.94 mm. There is no lipid present for several years though over the last 3 months, the patient has had some bleeding over that area. No other constitutional symptoms were reported at time of initial consultation. At time of initial consult, the patient was 11 weeks pregnant. No reported complications with the pregnancy. She denies any lymphadenopathy or any other palpable skin changes. No reported neurological changes.     Family history reviewed, notable for unspecified cancer in her father, diabetes in her mother. Social history reviewed, negative for significant alcohol or tobacco use. Positive ROS findings include: History of recent skin excision. No other pertinent positive findings. Review of Systems: A complete review of systems was obtained, negative except as described above. 11/16/21:  Patient has no new clinical symptoms since last evaluation. Interval neck ultrasound shows no lymphadenopathy. No there new clinical changes reported. The patient is 26 weeks pregnant, without complication. 3/16/22:  Patient doing well, no residual lymphadenopathy reported. She is post pardum x 2 weeks. PET scan done last week shows no abnormal metabolic uptake. No areas of abnormalities over WLE scar. Overall doing very well. Interval History:     3/21/23:  No new clinical worsening. PET scan shows NAHUM. No pain symptoms. No other complaints. No unintentional weight loss, increased fatigue, abdominal pain, or any other constitutional symptoms that would be concerning for malignancy.          Past Medical History:   Diagnosis Date    Abnormal Papanicolaou smear of cervix     Last abnormal 2010    Beta thalassemia minor     Gilbert syndrome     Liver disease     Gilbert Syndrome    Melanoma of neck ([facility] [address] 6/21/2021    Migraines     Thyroid disease       Past Surgical History:   Procedure Laterality Date    HX BREAST AUGMENTATION  5/13/2013    BREAST AUGMENTATION performed by Mary Garcia MD at [address] [facility]    HX OTHER SURGICAL Left 06/29/2021    Excision Melanoma left neck @ [facility] by Dr. Susan Harrison PROCEDURE BREAST      Augmentation 2013      Social History     Tobacco Use    Smoking status: Never    Smokeless tobacco: Never   Substance Use Topics    Alcohol use: Not Currently     Comment: PREGNANT      Family History   Problem Relation Age of Onset    Diabetes Mother     Cancer Father     Thyroid Disease Sister     No Known Problems Brother     Blindness Brother     Diabetes Maternal Grandmother     Heart Disease Maternal Grandfather     Hypertension Maternal Grandfather     Anesth Problems Neg Hx      Current Outpatient Medications   Medication Sig    SUMAtriptan (IMITREX) 100 mg tablet TAKE 1 TABLET BY MOUTH ONCE DAILY AS NEEDED FOR MIGRAINE    levothyroxine (SYNTHROID) 50 mcg tablet TAKE 1 TABLET BY MOUTH ONE TIME A DAY BEFORE BREAKFAST    triamcinolone acetonide (KENALOG) 0.1 % ointment  (Patient not taking: Reported on 3/21/2023)    magnesium oxide 500 mg tab Take  by mouth.    prenatal vit-iron fumarate-fa 27 mg iron- 0.8 mg tab tablet Take 1 Tablet by mouth daily. No current facility-administered medications for this visit. No Known Allergies     Physical Exam:     Visit Vitals  /68 (BP 1 Location: Left upper arm, BP Patient Position: Sitting)   Pulse 63   Temp 97.9 °F (36.6 °C) (Oral)   Resp 17   Ht 5' 6\" (1.676 m)   Wt 139 lb (63 kg)   SpO2 98%   BMI 22.44 kg/m²     ECOG PS: 0  General: alert, cooperative, no distress   Mental  status: normal mood, behavior, speech, dress, motor activity, and thought processes, able to follow commands   HENT: NCAT   Neck: No cervical, axillary, supraclavicular lymphadenopathy. WLE site is clean and healing nicely. Resp: no respiratory distress   Neuro: no gross deficits   Skin: no discoloration or lesions of concern on visible areas   Psychiatric: normal affect, consistent with stated mood, no evidence of hallucinations           Results:     Lab Results   Component Value Date/Time    WBC 7.1 09/20/2022 02:35 PM    HGB 10.3 (L) 09/20/2022 02:35 PM    HCT 34.0 (L) 09/20/2022 02:35 PM    PLATELET 601 00/14/8728 02:35 PM    MCV 63.8 (L) 09/20/2022 02:35 PM    ABS.  NEUTROPHILS 3.1 09/20/2022 02:35 PM     Lab Results   Component Value Date/Time    Sodium 140 09/20/2022 02:35 PM    Potassium 4.3 09/20/2022 02:35 PM    Chloride 105 09/20/2022 02:35 PM    CO2 30 09/20/2022 02:35 PM    Glucose 90 09/20/2022 02:35 PM    BUN 15 09/20/2022 02:35 PM    Creatinine 0.87 09/20/2022 02:35 PM    GFR est AA >60 09/20/2022 02:35 PM    GFR est non-AA >60 09/20/2022 02:35 PM    Calcium 9.5 09/20/2022 02:35 PM    Glucose (POC) 96 03/20/2023 07:11 AM     Lab Results   Component Value Date/Time    Bilirubin, total 2.1 (H) 09/20/2022 02:35 PM    ALT (SGPT) 23 09/20/2022 02:35 PM    Alk. phosphatase 62 09/20/2022 02:35 PM    Protein, total 7.1 09/20/2022 02:35 PM    Albumin 4.4 09/20/2022 02:35 PM    Globulin 2.7 09/20/2022 02:35 PM     US Results (most recent):  Results from [place] encounter on 08/29/22    US EXT NONVAS RT LTD    Narrative  EXAM: US EXT NONVAS RT LTD    INDICATION: Palpable right groin lymph node. History of melanoma. Dx: History of  melanoma [place] (ICD-10-CM)]; Enlarged lymph node [R59.9 (ICD-10-CM)]. COMPARISON: 3/20/2022 old bloody PET/CT. TECHNIQUE:  Real-time limited targeted sonography of the right groin was performed with  multiple static images obtained using both grayscale and color Doppler  techniques. FINDINGS:  Mildly prominent, sharply marginated lymph node measuring 1.0 x 0.8 x 0.5 cm  about the level of right common femoral vessels. No abnormal increased  vascularization on color Doppler. This was not FDG avid on prior PET/CT. Impression  Mildly prominent right inguinal lymph node, nonspecific. Attention  on follow-up is advised given reported history of melanoma. Assessment and Recommendations:      # Clinical Stage 1B, [id number], Cutaneous Melanoma  - [place] Lymph node could not be identified on surgical resection. No residual malignancy seen on wide local excision.   - initial diagnosis 6/15/2021  - Post pardum PET scan shows no evidence of increased metabolic activity.    - plan for routine post WLE follow up with her dermatologist.   - No clinical symptoms.   - Repeat PET scan in March of 2023  - plan for clinical follow up in 12 months for physical exam and review of PET scan results. PET Results (most recent):  Results from [place] encounter on 03/20/23    PET/CT TUMOR IMAGE [facility] BD[facility] W (SUB)    Narrative  PET/CT SCAN    PROCEDURE: Following IV injection of 10 mCi 18 Fluoro 2 deoxyglucose (FDG) and a  standard uptake delay, PET imaging is performed skull vertex to toes and axial,  sagittal and coronal images were acquired. Unenhanced CT is obtained for  anatomic localization, and attenuation correction of the PET scan. Patient  preprocedure blood glucose level: 96 mg/dL. CORRELATIVE IMAGING STUDIES: None. COMPARISON PET: 3/8/2022    HISTORY: The study is requested for subsequent treatment strategy. Melanoma. FINDINGS:    HEAD/NECK: No apparent foci of abnormal hypermetabolism. Cerebral evaluation is  limited by normal intense activity. There is mucosal thickening left maxillary sinus. CHEST: No foci of abnormal hypermetabolism. ABDOMEN/PELVIS: No foci of abnormal hypermetabolism. IUD overlies the uterus    SKELETON: No foci of abnormal hypermetabolism in the axial and visualized  appendicular skeleton. Impression  No Foci of Abnormal Hypermetabolism. Patient in agreement with treatment course and plan. She was asked to call with any new or worsening symptoms or development of neck lymphadenopathy. The patient verbalized understanding and agreement. # Beverely Douse Syndrome   - chronically elevated total bilirubin. # Microcytic anemia  - history of beta thalassemia minor  - will check iron stores.      Rk Carrera MD    Attending [address] [place] [address]

## 2023-03-21 NOTE — PROGRESS NOTES
Juanis Duggan is a 40 y.o. female who presents for follow up of   Chief Complaint   Patient presents with    Follow-up     cutaneous melanoma       The patient reports no new clinical symptoms or new complaints since last clinic evaluation. Pt is feeling good but feeling tired more. Pt denies any abnormal rash or itchy skin    No interval hospitalizations reported    No interval surgery or procedures reported    No reported new medication changes reported       Medications reviewed with the patient, and chart updated to reflect changes.

## 2023-04-24 DIAGNOSIS — C43.9 MALIGNANT MELANOMA, UNSPECIFIED SITE (HCC): Primary | ICD-10-CM

## 2023-05-02 ENCOUNTER — OFFICE VISIT (OUTPATIENT)
Dept: INTERNAL MEDICINE CLINIC | Age: 38
End: 2023-05-02
Payer: COMMERCIAL

## 2023-05-02 VITALS
TEMPERATURE: 97.9 F | HEIGHT: 66 IN | RESPIRATION RATE: 18 BRPM | OXYGEN SATURATION: 100 % | BODY MASS INDEX: 22.47 KG/M2 | WEIGHT: 139.8 LBS | HEART RATE: 62 BPM | DIASTOLIC BLOOD PRESSURE: 61 MMHG | SYSTOLIC BLOOD PRESSURE: 98 MMHG

## 2023-05-02 DIAGNOSIS — Z13.220 SCREENING CHOLESTEROL LEVEL: ICD-10-CM

## 2023-05-02 DIAGNOSIS — E03.9 HYPOTHYROIDISM DURING PREGNANCY IN FIRST TRIMESTER: ICD-10-CM

## 2023-05-02 DIAGNOSIS — O99.281 HYPOTHYROIDISM DURING PREGNANCY IN FIRST TRIMESTER: ICD-10-CM

## 2023-05-02 DIAGNOSIS — G43.909 MIGRAINE WITHOUT STATUS MIGRAINOSUS, NOT INTRACTABLE, UNSPECIFIED MIGRAINE TYPE: ICD-10-CM

## 2023-05-02 DIAGNOSIS — E03.9 ACQUIRED HYPOTHYROIDISM: Primary | ICD-10-CM

## 2023-05-02 DIAGNOSIS — Z13.1 SCREENING FOR DIABETES MELLITUS: ICD-10-CM

## 2023-05-02 PROCEDURE — 99395 PREV VISIT EST AGE 18-39: CPT | Performed by: INTERNAL MEDICINE

## 2023-05-03 LAB
ALBUMIN SERPL-MCNC: 4.5 G/DL (ref 3.5–5)
ALBUMIN/GLOB SERPL: 1.6 (ref 1.1–2.2)
ALP SERPL-CCNC: 44 U/L (ref 45–117)
ALT SERPL-CCNC: 14 U/L (ref 12–78)
ANION GAP SERPL CALC-SCNC: 4 MMOL/L (ref 5–15)
AST SERPL-CCNC: 13 U/L (ref 15–37)
BASOPHILS # BLD: 0.1 K/UL (ref 0–0.1)
BASOPHILS NFR BLD: 2 % (ref 0–1)
BILIRUB SERPL-MCNC: 3.7 MG/DL (ref 0.2–1)
BUN SERPL-MCNC: 11 MG/DL (ref 6–20)
BUN/CREAT SERPL: 14 (ref 12–20)
CALCIUM SERPL-MCNC: 9.8 MG/DL (ref 8.5–10.1)
CHLORIDE SERPL-SCNC: 110 MMOL/L (ref 97–108)
CHOLEST SERPL-MCNC: 117 MG/DL
CO2 SERPL-SCNC: 25 MMOL/L (ref 21–32)
CREAT SERPL-MCNC: 0.76 MG/DL (ref 0.55–1.02)
DIFFERENTIAL METHOD BLD: ABNORMAL
EOSINOPHIL # BLD: 0.1 K/UL (ref 0–0.4)
EOSINOPHIL NFR BLD: 2 % (ref 0–7)
ERYTHROCYTE [DISTWIDTH] IN BLOOD BY AUTOMATED COUNT: 17.2 % (ref 11.5–14.5)
EST. AVERAGE GLUCOSE BLD GHB EST-MCNC: 100 MG/DL
GLOBULIN SER CALC-MCNC: 2.8 G/DL (ref 2–4)
GLUCOSE SERPL-MCNC: 99 MG/DL (ref 65–100)
HBA1C MFR BLD: 5.1 % (ref 4–5.6)
HCT VFR BLD AUTO: 35.1 % (ref 35–47)
HDLC SERPL-MCNC: 40 MG/DL
HDLC SERPL: 2.9 (ref 0–5)
HGB BLD-MCNC: 10.5 G/DL (ref 11.5–16)
IMM GRANULOCYTES # BLD AUTO: 0 K/UL (ref 0–0.04)
IMM GRANULOCYTES NFR BLD AUTO: 0 % (ref 0–0.5)
LDLC SERPL CALC-MCNC: 64 MG/DL (ref 0–100)
LYMPHOCYTES # BLD: 3.2 K/UL (ref 0.8–3.5)
LYMPHOCYTES NFR BLD: 47 % (ref 12–49)
MCH RBC QN AUTO: 19.1 PG (ref 26–34)
MCHC RBC AUTO-ENTMCNC: 29.9 G/DL (ref 30–36.5)
MCV RBC AUTO: 63.8 FL (ref 80–99)
MONOCYTES # BLD: 0.4 K/UL (ref 0–1)
MONOCYTES NFR BLD: 6 % (ref 5–13)
NEUTS SEG # BLD: 2.9 K/UL (ref 1.8–8)
NEUTS SEG NFR BLD: 43 % (ref 32–75)
NRBC # BLD: 0 K/UL (ref 0–0.01)
NRBC BLD-RTO: 0 PER 100 WBC
PLATELET # BLD AUTO: 255 K/UL (ref 150–400)
POTASSIUM SERPL-SCNC: 4.4 MMOL/L (ref 3.5–5.1)
PROT SERPL-MCNC: 7.3 G/DL (ref 6.4–8.2)
RBC # BLD AUTO: 5.5 M/UL (ref 3.8–5.2)
RBC MORPH BLD: ABNORMAL
SODIUM SERPL-SCNC: 139 MMOL/L (ref 136–145)
T4 FREE SERPL-MCNC: 1 NG/DL (ref 0.8–1.5)
TRIGL SERPL-MCNC: 65 MG/DL (ref ?–150)
TSH SERPL DL<=0.05 MIU/L-ACNC: 0.48 UIU/ML (ref 0.36–3.74)
VLDLC SERPL CALC-MCNC: 13 MG/DL
WBC # BLD AUTO: 6.7 K/UL (ref 3.6–11)

## 2023-05-18 LAB
CHOLEST SERPL-MCNC: 109 MG/DL
GLUCOSE SERPL-MCNC: 101 MG/DL (ref 65–100)
HDLC SERPL-MCNC: 39 MG/DL
LDLC SERPL CALC-MCNC: 54 MG/DL (ref 0–100)
TRIGL SERPL-MCNC: 80 MG/DL (ref ?–150)

## 2023-06-16 RX ORDER — RIMEGEPANT SULFATE 75 MG/75MG
TABLET, ORALLY DISINTEGRATING ORAL
COMMUNITY
Start: 2023-05-10 | End: 2023-06-16 | Stop reason: SDUPTHER

## 2023-06-16 RX ORDER — RIMEGEPANT SULFATE 75 MG/75MG
75 TABLET, ORALLY DISINTEGRATING ORAL DAILY PRN
Qty: 16 TABLET | Refills: 1 | Status: SHIPPED | OUTPATIENT
Start: 2023-06-16

## 2023-07-14 RX ORDER — LEVOTHYROXINE SODIUM 0.05 MG/1
TABLET ORAL
Qty: 90 TABLET | Refills: 1 | Status: SHIPPED | OUTPATIENT
Start: 2023-07-14

## 2023-07-14 NOTE — TELEPHONE ENCOUNTER
----- Message from Marie Lui. Emmanuelle Isma sent at 7/14/2023  2:30 PM EDT -----  Regarding: levothyroxine refill  Contact: [phone]  Hello, my pharmacy said that they send a refill request for levothyroxine to be refilled, but have not received a response. Can I have refills on levothyroxine please? Thanks!   Paul Live

## 2023-09-23 LAB — HIV, EXTERNAL RESULT: NON REACTIVE

## 2023-09-26 LAB
C. TRACHOMATIS, EXTERNAL RESULT: NEGATIVE
HEP B, EXTERNAL RESULT: NEGATIVE
N. GONORRHOEAE, EXTERNAL RESULT: NEGATIVE
RUBELLA TITER, EXTERNAL RESULT: NORMAL
T. PALLIDUM (SYPHILIS) ANTIBODY, EXTERNAL RESULT: NON REACTIVE

## 2023-11-11 ENCOUNTER — PATIENT MESSAGE (OUTPATIENT)
Age: 38
End: 2023-11-11

## 2023-11-11 DIAGNOSIS — G43.E09 CHRONIC MIGRAINE WITH AURA WITHOUT STATUS MIGRAINOSUS, NOT INTRACTABLE: Primary | ICD-10-CM

## 2023-11-13 DIAGNOSIS — G43.E09 CHRONIC MIGRAINE WITH AURA WITHOUT STATUS MIGRAINOSUS, NOT INTRACTABLE: ICD-10-CM

## 2023-11-13 RX ORDER — SUMATRIPTAN 100 MG/1
100 TABLET, FILM COATED ORAL
Qty: 9 TABLET | Refills: 5 | Status: SHIPPED | OUTPATIENT
Start: 2023-11-13 | End: 2023-11-14 | Stop reason: SDUPTHER

## 2023-11-13 NOTE — TELEPHONE ENCOUNTER
----- Message from Cherl Duane. Christine Reilly sent at 11/13/2023  1:42 PM EST -----  Regarding: Sumatriptan refill  Contact: [phone]  Thank you! It looks like it might have been sent to [facility], they don't take my insurance. Would you mind resending to the [address] the [address]? Thanks!

## 2023-11-13 NOTE — TELEPHONE ENCOUNTER
This encounter was created in error - please disregard.   David Browne 11/12/2023 5:27 PM EST      ----- Message -----  From: Alisha Hudson  Sent: 11/11/2023 4:56 AM EST  To: *  Subject: Sumatriptan refill     Tobin Guajardo,  I think I prefer the sumatriptan over the nurtec for my migraines. Can I please have a refill of the sumatriptan sent to the [facility] pharmacy across the green from your office? 90 days supply if possible. Thank you. 118

## 2023-11-14 RX ORDER — SUMATRIPTAN 100 MG/1
100 TABLET, FILM COATED ORAL DAILY PRN
Qty: 9 TABLET | Refills: 5 | Status: SHIPPED | OUTPATIENT
Start: 2023-11-14

## 2023-11-29 SDOH — ECONOMIC STABILITY: INCOME INSECURITY: HOW HARD IS IT FOR YOU TO PAY FOR THE VERY BASICS LIKE FOOD, HOUSING, MEDICAL CARE, AND HEATING?: NOT HARD AT ALL

## 2023-11-29 SDOH — ECONOMIC STABILITY: FOOD INSECURITY: WITHIN THE PAST 12 MONTHS, THE FOOD YOU BOUGHT JUST DIDN'T LAST AND YOU DIDN'T HAVE MONEY TO GET MORE.: NEVER TRUE

## 2023-11-29 SDOH — ECONOMIC STABILITY: TRANSPORTATION INSECURITY
IN THE PAST 12 MONTHS, HAS LACK OF TRANSPORTATION KEPT YOU FROM MEETINGS, WORK, OR FROM GETTING THINGS NEEDED FOR DAILY LIVING?: NO

## 2023-11-29 SDOH — ECONOMIC STABILITY: HOUSING INSECURITY
IN THE LAST 12 MONTHS, WAS THERE A TIME WHEN YOU DID NOT HAVE A STEADY PLACE TO SLEEP OR SLEPT IN A SHELTER (INCLUDING NOW)?: NO

## 2023-11-29 SDOH — ECONOMIC STABILITY: FOOD INSECURITY: WITHIN THE PAST 12 MONTHS, YOU WORRIED THAT YOUR FOOD WOULD RUN OUT BEFORE YOU GOT MONEY TO BUY MORE.: NEVER TRUE

## 2023-11-30 ENCOUNTER — OFFICE VISIT (OUTPATIENT)
Age: 38
End: 2023-11-30
Payer: COMMERCIAL

## 2023-11-30 VITALS
HEART RATE: 86 BPM | HEIGHT: 66 IN | DIASTOLIC BLOOD PRESSURE: 60 MMHG | BODY MASS INDEX: 22.53 KG/M2 | WEIGHT: 140.2 LBS | SYSTOLIC BLOOD PRESSURE: 99 MMHG | TEMPERATURE: 97.4 F | OXYGEN SATURATION: 100 % | RESPIRATION RATE: 20 BRPM

## 2023-11-30 DIAGNOSIS — C43.4 MALIGNANT MELANOMA OF SCALP AND NECK (HCC): ICD-10-CM

## 2023-11-30 DIAGNOSIS — G43.009 MIGRAINE WITHOUT AURA AND WITHOUT STATUS MIGRAINOSUS, NOT INTRACTABLE: ICD-10-CM

## 2023-11-30 DIAGNOSIS — E03.8 HYPOTHYROIDISM DUE TO HASHIMOTO'S THYROIDITIS: Primary | ICD-10-CM

## 2023-11-30 DIAGNOSIS — E06.3 HYPOTHYROIDISM DUE TO HASHIMOTO'S THYROIDITIS: Primary | ICD-10-CM

## 2023-11-30 PROCEDURE — 99214 OFFICE O/P EST MOD 30 MIN: CPT | Performed by: INTERNAL MEDICINE

## 2023-11-30 RX ORDER — ASPIRIN 81 MG/1
81 TABLET, CHEWABLE ORAL DAILY
COMMUNITY

## 2023-11-30 ASSESSMENT — PATIENT HEALTH QUESTIONNAIRE - PHQ9
SUM OF ALL RESPONSES TO PHQ QUESTIONS 1-9: 0
2. FEELING DOWN, DEPRESSED OR HOPELESS: 0
SUM OF ALL RESPONSES TO PHQ QUESTIONS 1-9: 0
1. LITTLE INTEREST OR PLEASURE IN DOING THINGS: 0
SUM OF ALL RESPONSES TO PHQ9 QUESTIONS 1 & 2: 0
SUM OF ALL RESPONSES TO PHQ QUESTIONS 1-9: 0
SUM OF ALL RESPONSES TO PHQ QUESTIONS 1-9: 0

## 2023-11-30 NOTE — PROGRESS NOTES
1. \"Have you been to the ER, urgent care clinic since your last visit? Hospitalized since your last visit? \" No    2. \"Have you seen or consulted any other health care providers outside of the [address] [place] [address] since your last visit? \" No     3. For patients aged 43-73: Has the patient had a colonoscopy / FIT/ Cologuard? NA - based on age      If the patient is female:    4. For patients aged 43-66: Has the patient had a mammogram within the past 2 years? NA - based on age or sex      11. For patients aged 21-65: Has the patient had a pap smear?  Yes - no [place] present

## 2023-11-30 NOTE — PROGRESS NOTES
Ms. Dakota Cortés is presenting to follow up     CC:  Hypothyroidism       HPI:    Ms. Dakota Cortés   is a 41 y.o. female with a hx of hypothyoiridm  18 weeks pregnt  Has 21 month infant  Currently on synthroid 50 mcg   Having TSH checks at [facility] and reviewed results 0.8     Pregnancy is going well    Seen by hem onc Dr Stephanie Leach for hx of cutaneous melanoma   6/15/21: Shave Biopsy revealed a Breslow depth of 1.94 mm non-ulcerated, no vascular invasion, no micro satellites. Mitotic rate of 5/mm2.      - Post pardum PET scan shows no evidence of increased metabolic activity. - plan for routine post WLE follow up with her dermatologist.    - No clinical symptoms.    - Repeat PET scan in March of 2023   - plan for clinical follow up in 12 months for physical exam and review of PET scan results.           Review of systems:  Constitutional: negative for fever, chills, weight loss, night sweats   10 systems reviewed and negative other then HPI     Past Medical History:   Diagnosis Date    Abnormal Papanicolaou smear of cervix     Last abnormal 2010    Beta thalassemia minor     Gilbert syndrome     Liver disease     Gilbert Syndrome    Melanoma of neck ([address]) 6/21/2021    Migraines     Thyroid disease         Past Surgical History:   Procedure Laterality Date    BREAST SURGERY  5/13/2013    BREAST AUGMENTATION performed by Jen Quintero MD at [address] [place], [place] [address]e  2013    OTHER SURGICAL HISTORY Left 06/29/2021    Excision Melanoma left neck @ [facility] by Dr. Claire Patricia         No Known Allergies    Current Outpatient Medications on File Prior to Visit   Medication Sig Dispense Refill    aspirin 81 MG chewable tablet Take 1 tablet by mouth daily      SUMAtriptan (IMITREX) 100 MG tablet Take 1 tablet by mouth daily as needed for Migraine 9 tablet 5    levothyroxine (SYNTHROID) 50 MCG tablet TAKE 1 TABLET BY MOUTH ONE TIME A DAY BEFORE BREAKFAST 90 tablet 1    magnesium Oxide

## 2024-01-15 RX ORDER — LEVOTHYROXINE SODIUM 0.05 MG/1
TABLET ORAL
Qty: 90 TABLET | Refills: 1 | Status: SHIPPED | OUTPATIENT
Start: 2024-01-15

## 2024-03-01 ENCOUNTER — TELEPHONE (OUTPATIENT)
Age: 39
End: 2024-03-01

## 2024-03-01 NOTE — TELEPHONE ENCOUNTER
Called pt to give her pet scan info and pt stated she has meaning to give our office a call because she is pregnant and is due May 02,2024. She wants to cancel all appointments and scans until after May.

## 2024-04-01 LAB — GBS, EXTERNAL RESULT: NEGATIVE

## 2024-04-16 ENCOUNTER — HOSPITAL ENCOUNTER (OUTPATIENT)
Facility: HOSPITAL | Age: 39
Setting detail: OBSERVATION
Discharge: HOME | End: 2024-04-16
Attending: OBSTETRICS & GYNECOLOGY | Admitting: OBSTETRICS & GYNECOLOGY
Payer: COMMERCIAL

## 2024-04-16 VITALS
OXYGEN SATURATION: 100 % | BODY MASS INDEX: 26.03 KG/M2 | HEIGHT: 66 IN | DIASTOLIC BLOOD PRESSURE: 72 MMHG | HEART RATE: 89 BPM | SYSTOLIC BLOOD PRESSURE: 116 MMHG | WEIGHT: 162 LBS | TEMPERATURE: 97.8 F | RESPIRATION RATE: 18 BRPM

## 2024-04-16 LAB
ABO + RH BLD: NORMAL
AMPHET UR QL SCN: NEGATIVE
BARBITURATES UR QL SCN: NEGATIVE
BASOPHILS # BLD: 0.1 K/UL (ref 0–0.1)
BASOPHILS NFR BLD: 1 % (ref 0–1)
BENZODIAZ UR QL: NEGATIVE
BLOOD GROUP ANTIBODIES SERPL: NORMAL
CANNABINOIDS UR QL SCN: NEGATIVE
COCAINE UR QL SCN: NEGATIVE
DIFFERENTIAL METHOD BLD: ABNORMAL
EOSINOPHIL # BLD: 0.1 K/UL (ref 0–0.4)
EOSINOPHIL NFR BLD: 1 % (ref 0–7)
ERYTHROCYTE [DISTWIDTH] IN BLOOD BY AUTOMATED COUNT: 17.2 % (ref 11.5–14.5)
HCT VFR BLD AUTO: 29.4 % (ref 35–47)
HGB BLD-MCNC: 8.8 G/DL (ref 11.5–16)
IMM GRANULOCYTES # BLD AUTO: 0.3 K/UL (ref 0–0.04)
IMM GRANULOCYTES NFR BLD AUTO: 2 % (ref 0–0.5)
LYMPHOCYTES # BLD: 3.3 K/UL (ref 0.8–3.5)
LYMPHOCYTES NFR BLD: 25 % (ref 12–49)
Lab: NORMAL
MCH RBC QN AUTO: 20.4 PG (ref 26–34)
MCHC RBC AUTO-ENTMCNC: 29.9 G/DL (ref 30–36.5)
MCV RBC AUTO: 68.2 FL (ref 80–99)
METHADONE UR QL: NEGATIVE
MONOCYTES # BLD: 0.7 K/UL (ref 0–1)
MONOCYTES NFR BLD: 5 % (ref 5–13)
NEUTS SEG # BLD: 8.5 K/UL (ref 1.8–8)
NEUTS SEG NFR BLD: 66 % (ref 32–75)
NRBC # BLD: 0.05 K/UL (ref 0–0.01)
NRBC BLD-RTO: 0.4 PER 100 WBC
OPIATES UR QL: NEGATIVE
PCP UR QL: NEGATIVE
PLATELET # BLD AUTO: 177 K/UL (ref 150–400)
RBC # BLD AUTO: 4.31 M/UL (ref 3.8–5.2)
RBC MORPH BLD: ABNORMAL
SPECIMEN EXP DATE BLD: NORMAL
WBC # BLD AUTO: 13 K/UL (ref 3.6–11)

## 2024-04-16 PROCEDURE — 36415 COLL VENOUS BLD VENIPUNCTURE: CPT

## 2024-04-16 PROCEDURE — G0378 HOSPITAL OBSERVATION PER HR: HCPCS

## 2024-04-16 PROCEDURE — 59412 ANTEPARTUM MANIPULATION: CPT | Performed by: OBSTETRICS & GYNECOLOGY

## 2024-04-16 PROCEDURE — 59025 FETAL NON-STRESS TEST: CPT | Performed by: OBSTETRICS & GYNECOLOGY

## 2024-04-16 PROCEDURE — 86901 BLOOD TYPING SEROLOGIC RH(D): CPT

## 2024-04-16 PROCEDURE — 86850 RBC ANTIBODY SCREEN: CPT

## 2024-04-16 PROCEDURE — 86900 BLOOD TYPING SEROLOGIC ABO: CPT

## 2024-04-16 PROCEDURE — 6360000002 HC RX W HCPCS

## 2024-04-16 PROCEDURE — 85025 COMPLETE CBC W/AUTO DIFF WBC: CPT

## 2024-04-16 PROCEDURE — 80307 DRUG TEST PRSMV CHEM ANLYZR: CPT

## 2024-04-16 PROCEDURE — 76815 OB US LIMITED FETUS(S): CPT | Performed by: OBSTETRICS & GYNECOLOGY

## 2024-04-16 PROCEDURE — 96372 THER/PROPH/DIAG INJ SC/IM: CPT

## 2024-04-16 RX ORDER — MAGNESIUM CARB/ALUMINUM HYDROX 105-160MG
120 TABLET,CHEWABLE ORAL ONCE
Status: DISCONTINUED | OUTPATIENT
Start: 2024-04-16 | End: 2024-04-16 | Stop reason: HOSPADM

## 2024-04-16 RX ORDER — TERBUTALINE SULFATE 1 MG/ML
INJECTION, SOLUTION SUBCUTANEOUS
Status: COMPLETED
Start: 2024-04-16 | End: 2024-04-16

## 2024-04-16 RX ORDER — TERBUTALINE SULFATE 1 MG/ML
0.25 INJECTION, SOLUTION SUBCUTANEOUS ONCE
Status: COMPLETED | OUTPATIENT
Start: 2024-04-16 | End: 2024-04-16

## 2024-04-16 RX ORDER — MAGNESIUM CARB/ALUMINUM HYDROX 105-160MG
2.5 TABLET,CHEWABLE ORAL ONCE
Status: DISCONTINUED | OUTPATIENT
Start: 2024-04-16 | End: 2024-04-16 | Stop reason: HOSPADM

## 2024-04-16 RX ADMIN — TERBUTALINE SULFATE 0.25 MG: 1 INJECTION, SOLUTION SUBCUTANEOUS at 10:04

## 2024-04-16 NOTE — DISCHARGE INSTRUCTIONS
Week 38 of Your Pregnancy: Care Instructions  Believe it or not, your baby is almost here. You may notice how your baby responds to sounds, warmth, cold, and light. You may even know what kind of music your baby likes.    Even if you expect a vaginal birth, it's a good idea to learn about  section ().  is the delivery of a baby through a cut (incision) in your belly. It's a major surgery, so it has more risks than a vaginal birth.    During the first 2 weeks after the birth, limit visitors. Don't allow visitors who have colds or infections. Ask visitors to wash their hands before they touch your baby. And never let anyone smoke around your baby.    Know about unplanned C-sections.  Reasons for an unplanned  include labor that slows or stops, signs of distress in your baby, and high blood pressure or other problems for you.     Know about planned C-sections.  If your baby isn't in a head-down position for delivery (breech position), your doctor may plan a . Or you may have a planned  if you're having twins or more.     Get as much help as you can while you're in the hospital.  You can learn about feeding, diapering, and bathing your baby.     Talk to your doctor or midwife about how to care for the umbilical cord stump.  If your baby will be circumcised, also ask about how to care for that.     Ask friends or family for help, as you need it.  If you can, have another adult in your home for at least 2 or 3 days after the birth.     Try to nap when your baby naps.  This may be your best chance to get some sleep.     Watch for changes in your mental health.  For the first 1 to 2 weeks after birth, it's common to cry or feel sad or irritable. If these feelings last for more than 2 weeks, you may have postpartum depression. Ask your doctor for help. Postpartum depression can be treated.   Follow-up care is a key part of your treatment and safety. Be sure to make

## 2024-04-16 NOTE — PROCEDURES
Date: ____4/16/24___________  Surgeon: ____Denise De Santiago MD  Assistant(s): _____none__________  PreOp Dx: Breech presentation, prior vaginal delivery  PostOp Dx: Cephalic presentation, successful version  Operation: External Cephalic Version  Findings: Stable fetal HR pre and post procedure      Procedure Note: A bedside ultrasound was performed which confirmed the single intrauterine pregnancy and a  breech presentation. There was noted to be adequate fluid INGRID 16 and a reactive NST. The fetal spine was along the materal left side, and head in the maternal RUQ. Using manual pressure, the fetus's breech was elevated from the maternal pelvis with the right hand. The left hand was used to place gentle pressure along the posterior occupit to stimulate a forward roll. On the first attempt, there fetus was successfully verted to cephalic presentation. Following the procedure, she was placed on the NST to monitor x 1 hour and had cat I tracing with accels and no decels and no contractions.    Bedside US performed and interpreted by me  - pisano gestation  Breech presentation  INGRID 16  Anterior placenta  After version - cephalic presentation

## 2024-04-16 NOTE — H&P
History & Physical    Name: Jessica Trent MRN: [id number]  SSN: [id number]    YOB: 1985  Age: 38 y.o.  Sex: female      Subjective:     Chief Complaint:  Pregnancy and breech presentation    History of Present Illness: Ms. Trent is a 38 y.o.  @ 37w5d here for ecv in the setting of breech presentation. No complaints.    Please see prenatal records which have also been sent to Labor and Delivery and added to Epic for details.    history of postpartum hemorrhage  resolved with cytotec/methergine/TXA;   multiple placental lakes, can be associated with iugr, growth 28__, growth 36__  disease of liver  Gilbert syndrome Baseline bili_(2.7)_ Met with [facility] last pregnancy low threshold for IVs if dehydrated  history of malignant neoplasm  Melanoma  beta thalassemia trait  FOB nml electrophoresis  anemia  2/2 beta thal trait, 28 weks 7.9, ferritin_93), iron to get ferritin upper limit of normal_  hypothyroidism  Diagnosed in [facility], Synthroid 50mcg daily Initial TSH .16, .82, 20wks 0.71 28wk__0.855  advanced maternal age   Mat21: neg AFP:neg  breech presentation  at 36 weeks    OB History    Para Term  AB Living   3   1   1 1   SAB IAB Ectopic Molar Multiple Live Births                    # Outcome Date GA Lbr Tee/2nd Weight Sex Delivery Anes PTL Lv   1 Current              Past Medical History:   Diagnosis Date    Abnormal Papanicolaou smear of cervix     Last abnormal     Beta thalassemia minor     Gilbert syndrome     Liver disease     Gilbert Syndrome    Melanoma of neck (HCC) 2021    Migraines     Thyroid disease      Past Surgical History:   Procedure Laterality Date    BREAST SURGERY  2013    BREAST AUGMENTATION performed by Jeffrey Grimes MD at [facility] AMBULATORY OR    BREAST SURGERY      Augmentation     OTHER SURGICAL HISTORY Left 2021    Excision Melanoma left neck @ [facility] by Dr. SHONDA Yoder    TONSILLECTOMY       Social History     Occupational History

## 2024-04-16 NOTE — PROGRESS NOTES
External version started at 1042 and successfully completed at 1045.  Patient placed on external monitors for a one hour NST.  Will continue to monitor.

## 2024-04-16 NOTE — PROGRESS NOTES
1145: NST reactive post external version. OK for discharge per Dr. De Santiago.    Patient education provided regarding discharge. Opportunity given for patient to ask questions all questions answered appropriately. Patient ambulated off unit. Patient remains remains pregnant and plans to deliver at [facility].

## 2024-04-16 NOTE — DISCHARGE SUMMARY
Antepartum  Discharge Summary     Patient ID:  Jessica Trent  [id number]  38 y.o.  1985    Admit date: 4/16/2024    Discharge date: 4/16/2024    Admission Diagnoses:    Patient Active Problem List   Diagnosis    Melanoma of neck (HCC)    Post-dates pregnancy    Gilbert syndrome    Normal labor    Beta thalassemia minor    Breech presentation at birth       Discharge Diagnoses: There are no discharge diagnoses documented for the most recent discharge.  Patient Active Problem List   Diagnosis    Melanoma of neck (HCC)    Post-dates pregnancy    Gilbert syndrome    Normal labor    Beta thalassemia minor    Breech presentation at birth       Procedures for this admission:     Hospital Course: Presented for ecv. Successful. Watched x 1 hour post procedure with reassuring teseting    Disposition: home    Discharged Condition: good            Patient Instructions:   Current Discharge Medication List        CONTINUE these medications which have NOT CHANGED    Details   Prenatal MV-Min-Fe Fum-FA-DHA (PRENATAL 1 PO) Take by mouth daily      levothyroxine (SYNTHROID) 50 MCG tablet TAKE 1 TABLET BY MOUTH ONE TIME A DAY BEFORE BREAKFAST  Qty: 90 tablet, Refills: 1      aspirin 81 MG chewable tablet Take 1 tablet by mouth daily      SUMAtriptan (IMITREX) 100 MG tablet Take 1 tablet by mouth daily as needed for Migraine  Qty: 9 tablet, Refills: 5    Associated Diagnoses: Chronic migraine with aura without status migrainosus, not intractable      magnesium Oxide 500 MG TABS Take by mouth      triamcinolone (KENALOG) 0.1 % ointment ceived the following from [facility]: Outside name: triamcinolone acetonide (KENALOG) 0.1 % ointment           Activity: activity as tolerated  Diet: regular diet    Follow-up with No follow-ups on file.     Signed:  Denise De Santiago MD  4/16/2024  12:21 PM

## 2024-04-17 ENCOUNTER — CARE COORDINATION (OUTPATIENT)
Dept: OTHER | Facility: CLINIC | Age: 39
End: 2024-04-17

## 2024-04-17 NOTE — CARE COORDINATION
ACM attempted to reach patient for Maternity Care Management/ Care transitions call. HIPAA compliant message left requesting a return phone call at patients convenience. Will continue to follow.   
I personally performed the service described in the documentation recorded by the scribe in my presence, and it accurately and completely records my words and actions.

## 2024-04-18 ENCOUNTER — CARE COORDINATION (OUTPATIENT)
Dept: OTHER | Facility: CLINIC | Age: 39
End: 2024-04-18

## 2024-04-18 NOTE — CARE COORDINATION
Patient eligible for [facility] Maternity Management Program    Maternity Care Manager contacted the patient by telephone to discuss the maternity management program.  Patient agrees to care management services at this time. Verified name and  with patient as identifiers.   Patient Current Location: [place]      Call within 2 business days of discharge: Yes     Last Discharge Facility       Date Complaint Diagnosis Description Type Department Provider    24 external version  Admission (Discharged) [id number] Denise De Santiago MD     Risk Factors Identified:  version elective      Needs to be reviewed by the provider   none         Method of communication with provider : none    Advance Care Planning:   Does patient have an Advance Directive:  not on file.     Does patient have OB/Gyn Selected? Yes    Discussed follow up appointments. If no appointment was previously scheduled, appointment scheduling offered: Yes  BS follow up appointment(s): No future appointments.  Non-[facility] follow up appointment(s): 24    OB History:   OB History    Para Term  AB Living   3 1 1 0 1 1   SAB IAB Ectopic Molar Multiple Live Births   1 0 0 0 0 1      # Outcome Date GA Lbr Tee/2nd Weight Sex Delivery Anes PTL Lv   3 Current            2 SAB 2020 8w0d   U SAB      1 Term                38w0d    Medication reconciliation was performed with patient, who verbalizes understanding of administration of home medications. Advised obtaining a 90-day supply of all daily and as-needed medications.     Barriers/Support system:  patient and spouse  Return to work planning? Yes      2nd and 3rd Trimester Focused Assessment: 38w pt went in for a version baby was breech and it was successul. Pt doing well thus far and she is still working. Discussed the leave process and next outreach. There are no plans for induction at this time.  Healthy behavior review  Fetal movement-yes  Red flags:

## 2024-04-26 ENCOUNTER — ANESTHESIA (OUTPATIENT)
Dept: LABOR AND DELIVERY | Facility: HOSPITAL | Age: 39
End: 2024-04-26
Payer: COMMERCIAL

## 2024-04-26 ENCOUNTER — HOSPITAL ENCOUNTER (INPATIENT)
Facility: HOSPITAL | Age: 39
LOS: 2 days | Discharge: HOME | End: 2024-04-28
Attending: OBSTETRICS & GYNECOLOGY | Admitting: OBSTETRICS & GYNECOLOGY
Payer: COMMERCIAL

## 2024-04-26 ENCOUNTER — ANESTHESIA EVENT (OUTPATIENT)
Dept: LABOR AND DELIVERY | Facility: HOSPITAL | Age: 39
End: 2024-04-26
Payer: COMMERCIAL

## 2024-04-26 LAB
ALBUMIN SERPL-MCNC: 2.8 G/DL (ref 3.5–5)
ALBUMIN/GLOB SERPL: 0.7 (ref 1.1–2.2)
ALP SERPL-CCNC: 109 U/L (ref 45–117)
ALT SERPL-CCNC: 20 U/L (ref 12–78)
AMPHET UR QL SCN: NEGATIVE
ANION GAP SERPL CALC-SCNC: 6 MMOL/L (ref 5–15)
AST SERPL-CCNC: 33 U/L (ref 15–37)
BARBITURATES UR QL SCN: NEGATIVE
BASOPHILS # BLD: 0 K/UL (ref 0–0.1)
BASOPHILS NFR BLD: 0 % (ref 0–1)
BENZODIAZ UR QL: NEGATIVE
BILIRUB SERPL-MCNC: 1.6 MG/DL (ref 0.2–1)
BUN SERPL-MCNC: 9 MG/DL (ref 6–20)
BUN/CREAT SERPL: 13 (ref 12–20)
CALCIUM SERPL-MCNC: 9.7 MG/DL (ref 8.5–10.1)
CANNABINOIDS UR QL SCN: NEGATIVE
CHLORIDE SERPL-SCNC: 109 MMOL/L (ref 97–108)
CO2 SERPL-SCNC: 23 MMOL/L (ref 21–32)
COCAINE UR QL SCN: NEGATIVE
CREAT SERPL-MCNC: 0.72 MG/DL (ref 0.55–1.02)
DIFFERENTIAL METHOD BLD: ABNORMAL
EOSINOPHIL # BLD: 0.1 K/UL (ref 0–0.4)
EOSINOPHIL NFR BLD: 1 % (ref 0–7)
ERYTHROCYTE [DISTWIDTH] IN BLOOD BY AUTOMATED COUNT: 17.1 % (ref 11.5–14.5)
GLOBULIN SER CALC-MCNC: 3.9 G/DL (ref 2–4)
GLUCOSE SERPL-MCNC: 110 MG/DL (ref 65–100)
HCT VFR BLD AUTO: 28.9 % (ref 35–47)
HGB BLD-MCNC: 8.7 G/DL (ref 11.5–16)
IMM GRANULOCYTES # BLD AUTO: 0.2 K/UL (ref 0–0.04)
IMM GRANULOCYTES NFR BLD AUTO: 2 % (ref 0–0.5)
LYMPHOCYTES # BLD: 2.9 K/UL (ref 0.8–3.5)
LYMPHOCYTES NFR BLD: 25 % (ref 12–49)
Lab: NORMAL
MCH RBC QN AUTO: 20 PG (ref 26–34)
MCHC RBC AUTO-ENTMCNC: 30.1 G/DL (ref 30–36.5)
MCV RBC AUTO: 66.6 FL (ref 80–99)
METHADONE UR QL: NEGATIVE
MONOCYTES # BLD: 0.6 K/UL (ref 0–1)
MONOCYTES NFR BLD: 5 % (ref 5–13)
NEUTS SEG # BLD: 7.9 K/UL (ref 1.8–8)
NEUTS SEG NFR BLD: 67 % (ref 32–75)
NRBC # BLD: 0.06 K/UL (ref 0–0.01)
NRBC BLD-RTO: 0.5 PER 100 WBC
OPIATES UR QL: NEGATIVE
PCP UR QL: NEGATIVE
PLATELET # BLD AUTO: 193 K/UL (ref 150–400)
POTASSIUM SERPL-SCNC: 4.1 MMOL/L (ref 3.5–5.1)
PROT SERPL-MCNC: 6.7 G/DL (ref 6.4–8.2)
RBC # BLD AUTO: 4.34 M/UL (ref 3.8–5.2)
RBC MORPH BLD: ABNORMAL
SODIUM SERPL-SCNC: 138 MMOL/L (ref 136–145)
WBC # BLD AUTO: 11.7 K/UL (ref 3.6–11)

## 2024-04-26 PROCEDURE — 86900 BLOOD TYPING SEROLOGIC ABO: CPT

## 2024-04-26 PROCEDURE — 7210000100 HC LABOR FEE PER 1 HR

## 2024-04-26 PROCEDURE — 6360000002 HC RX W HCPCS: Performed by: ANESTHESIOLOGY

## 2024-04-26 PROCEDURE — 10907ZC DRAINAGE OF AMNIOTIC FLUID, THERAPEUTIC FROM PRODUCTS OF CONCEPTION, VIA NATURAL OR ARTIFICIAL OPENING: ICD-10-PCS | Performed by: OBSTETRICS & GYNECOLOGY

## 2024-04-26 PROCEDURE — 36415 COLL VENOUS BLD VENIPUNCTURE: CPT

## 2024-04-26 PROCEDURE — 6360000002 HC RX W HCPCS: Performed by: OBSTETRICS & GYNECOLOGY

## 2024-04-26 PROCEDURE — 2580000003 HC RX 258: Performed by: OBSTETRICS & GYNECOLOGY

## 2024-04-26 PROCEDURE — 86923 COMPATIBILITY TEST ELECTRIC: CPT

## 2024-04-26 PROCEDURE — 86901 BLOOD TYPING SEROLOGIC RH(D): CPT

## 2024-04-26 PROCEDURE — 2500000003 HC RX 250 WO HCPCS

## 2024-04-26 PROCEDURE — G0379 DIRECT REFER HOSPITAL OBSERV: HCPCS

## 2024-04-26 PROCEDURE — 3700000025 EPIDURAL BLOCK: Performed by: ANESTHESIOLOGY

## 2024-04-26 PROCEDURE — 86850 RBC ANTIBODY SCREEN: CPT

## 2024-04-26 PROCEDURE — 1120000000 HC RM PRIVATE OB

## 2024-04-26 PROCEDURE — 80307 DRUG TEST PRSMV CHEM ANLYZR: CPT

## 2024-04-26 PROCEDURE — 80053 COMPREHEN METABOLIC PANEL: CPT

## 2024-04-26 PROCEDURE — G0378 HOSPITAL OBSERVATION PER HR: HCPCS

## 2024-04-26 PROCEDURE — 3E033VJ INTRODUCTION OF OTHER HORMONE INTO PERIPHERAL VEIN, PERCUTANEOUS APPROACH: ICD-10-PCS | Performed by: OBSTETRICS & GYNECOLOGY

## 2024-04-26 PROCEDURE — 85025 COMPLETE CBC W/AUTO DIFF WBC: CPT

## 2024-04-26 RX ORDER — SODIUM CHLORIDE 0.9 % (FLUSH) 0.9 %
5-40 SYRINGE (ML) INJECTION EVERY 12 HOURS SCHEDULED
Status: DISCONTINUED | OUTPATIENT
Start: 2024-04-26 | End: 2024-04-27

## 2024-04-26 RX ORDER — SODIUM CHLORIDE, SODIUM LACTATE, POTASSIUM CHLORIDE, CALCIUM CHLORIDE 600; 310; 30; 20 MG/100ML; MG/100ML; MG/100ML; MG/100ML
INJECTION, SOLUTION INTRAVENOUS CONTINUOUS
Status: DISCONTINUED | OUTPATIENT
Start: 2024-04-26 | End: 2024-04-27

## 2024-04-26 RX ORDER — BUPIVACAINE HYDROCHLORIDE 2.5 MG/ML
INJECTION, SOLUTION EPIDURAL; INFILTRATION; INTRACAUDAL PRN
Status: DISCONTINUED | OUTPATIENT
Start: 2024-04-26 | End: 2024-04-27 | Stop reason: SDUPTHER

## 2024-04-26 RX ORDER — SODIUM CHLORIDE, SODIUM LACTATE, POTASSIUM CHLORIDE, AND CALCIUM CHLORIDE .6; .31; .03; .02 G/100ML; G/100ML; G/100ML; G/100ML
500 INJECTION, SOLUTION INTRAVENOUS PRN
Status: DISCONTINUED | OUTPATIENT
Start: 2024-04-26 | End: 2024-04-27

## 2024-04-26 RX ORDER — FENTANYL/BUPIVACAINE/NS/PF 2-1250MCG
12 PLASTIC BAG, INJECTION (ML) INJECTION CONTINUOUS
Status: DISCONTINUED | OUTPATIENT
Start: 2024-04-26 | End: 2024-04-27

## 2024-04-26 RX ORDER — FENTANYL/BUPIVACAINE/NS/PF 2-1250MCG
PLASTIC BAG, INJECTION (ML) INJECTION
Status: COMPLETED
Start: 2024-04-26 | End: 2024-04-26

## 2024-04-26 RX ORDER — ONDANSETRON 2 MG/ML
4 INJECTION INTRAMUSCULAR; INTRAVENOUS EVERY 6 HOURS PRN
Status: DISCONTINUED | OUTPATIENT
Start: 2024-04-26 | End: 2024-04-27

## 2024-04-26 RX ORDER — SODIUM CHLORIDE, SODIUM LACTATE, POTASSIUM CHLORIDE, AND CALCIUM CHLORIDE .6; .31; .03; .02 G/100ML; G/100ML; G/100ML; G/100ML
1000 INJECTION, SOLUTION INTRAVENOUS PRN
Status: DISCONTINUED | OUTPATIENT
Start: 2024-04-26 | End: 2024-04-27

## 2024-04-26 RX ORDER — TERBUTALINE SULFATE 1 MG/ML
0.25 INJECTION, SOLUTION SUBCUTANEOUS
Status: DISCONTINUED | OUTPATIENT
Start: 2024-04-26 | End: 2024-04-27

## 2024-04-26 RX ORDER — BUPIVACAINE HYDROCHLORIDE 2.5 MG/ML
INJECTION, SOLUTION EPIDURAL; INFILTRATION; INTRACAUDAL
Status: COMPLETED
Start: 2024-04-26 | End: 2024-04-26

## 2024-04-26 RX ORDER — SODIUM CHLORIDE 9 MG/ML
25 INJECTION, SOLUTION INTRAVENOUS PRN
Status: DISCONTINUED | OUTPATIENT
Start: 2024-04-26 | End: 2024-04-27

## 2024-04-26 RX ORDER — NALOXONE HYDROCHLORIDE 0.4 MG/ML
INJECTION, SOLUTION INTRAMUSCULAR; INTRAVENOUS; SUBCUTANEOUS PRN
Status: DISCONTINUED | OUTPATIENT
Start: 2024-04-26 | End: 2024-04-27

## 2024-04-26 RX ORDER — ACETAMINOPHEN 325 MG/1
650 TABLET ORAL EVERY 4 HOURS PRN
Status: DISCONTINUED | OUTPATIENT
Start: 2024-04-26 | End: 2024-04-27

## 2024-04-26 RX ORDER — SODIUM CHLORIDE 0.9 % (FLUSH) 0.9 %
5-40 SYRINGE (ML) INJECTION PRN
Status: DISCONTINUED | OUTPATIENT
Start: 2024-04-26 | End: 2024-04-27

## 2024-04-26 RX ADMIN — Medication 1 MILLI-UNITS/MIN: at 13:19

## 2024-04-26 RX ADMIN — Medication 12 ML/HR: at 22:05

## 2024-04-26 RX ADMIN — BUPIVACAINE HYDROCHLORIDE 20 ML: 2.5 INJECTION, SOLUTION EPIDURAL; INFILTRATION; INTRACAUDAL; PERINEURAL at 21:58

## 2024-04-26 RX ADMIN — SODIUM CHLORIDE, POTASSIUM CHLORIDE, SODIUM LACTATE AND CALCIUM CHLORIDE 1000 ML: 600; 310; 30; 20 INJECTION, SOLUTION INTRAVENOUS at 13:46

## 2024-04-26 NOTE — H&P
Brother     Thyroid Disease Sister     Cancer Father     Diabetes Mother     Hypertension Maternal Grandfather     Blindness Brother     Diabetes Maternal Grandmother     Heart Disease Maternal Grandfather     Anesth Problems Neg Hx        No Known Allergies  Prior to Admission medications    Medication Sig Start Date End Date Taking? Authorizing Provider   Prenatal MV-Min-Fe Fum-FA-DHA (PRENATAL 1 PO) Take by mouth daily    Mervin Roberto MD   levothyroxine (SYNTHROID) 50 MCG tablet TAKE 1 TABLET BY MOUTH ONE TIME A DAY BEFORE BREAKFAST 1/15/24   Florence Ramsey MD   aspirin 81 MG chewable tablet Take 1 tablet by mouth daily    Mervin Roberto MD   SUMAtriptan (IMITREX) 100 MG tablet Take 1 tablet by mouth daily as needed for Migraine  Patient not taking: Reported on 4/26/2024 11/14/23   Florence Ramsey MD   magnesium Oxide 500 MG TABS Take by mouth  Patient not taking: Reported on 11/30/2023    Automatic Reconciliation, Ar   triamcinolone (KENALOG) 0.1 % ointment ceived the following from [facility]: Outside name: triamcinolone acetonide (KENALOG) 0.1 % ointment  Patient not taking: Reported on 11/30/2023 1/17/23   Automatic Reconciliation, Ar        Review of Systems: A comprehensive review of systems was negative except for that written in the History of Present Illness.    Objective:     Vitals:  Vitals:    04/26/24 1154 04/26/24 1157 04/26/24 1200   BP:  130/79 130/79   Pulse: 89 88 88   Resp:   18   TempSrc:   Oral   SpO2: 99%  99%         /79   Pulse 88   Resp 18   SpO2 99%       Lungs:   Respiratory non labored   Heart:   No clubbing, cyanosis or edema   Abdomen:  Gravid   Presentations: Cephalic   Cervix:     Dilation: 3cm    Effacement: 75%    Station:  -2       Impression/Plan:     Active Problems:    * No active hospital problems. *  Resolved Problems:    * No resolved hospital problems. *       Plan: Admit for induction of labor. Group B Strep negative

## 2024-04-26 NOTE — PROGRESS NOTES
Jessica Trent is a 38 y.o.  at 39w1d patient of Dr De Santiago at [facility] who presents to L&D for schedule IOL.  She reports Positive FM, denies vaginal bleeding, LOF, and contractions. She also denies Headaches, Scotoma, RUQ pain, and Edema. Urine sample and labs obtained. EFM and toco placed for initial assessment.     1230 Dr. Medina at the bedside. SVE exam performed. AROM. Verbal order to start pitocin

## 2024-04-26 NOTE — PROGRESS NOTES
: Bedside and Verbal shift change report given to ALMA Tamayo (oncoming nurse) by ALMA Lockett (offgoing nurse). Report included the following information Nurse Handoff Report, Intake/Output, and MAR.     :  at the bedside for SVE. PT requesting epidural for pain.    :Anesthesia at bedside at . Patient positioned to side of the bed, EFM & TOCO adjusted to accommodate sterile field. Difficulty tracing due to maternal position. Time out completed at . Successful epidural is completed by Dr Lombardo. Patient is repositioned supine in bed with wedge under left hip. EFM and TOCO replaced and blood pressure frequency increased. Hayes catheter placed and epidural continuous infusion is started.    112:  at the bedside for SVE. PT complete. RN remained at bedside throughout pushing.  EFM continuously assessed.     0120:  of live female infant delivered by     0140: D&C called for remained placenta after delivery. Pt transported to OR by this RN and anesthesia. Quantitative QBL caluclated from underbuttocks drape in vaginal delivery & OR as well as pads that were underneath patient. MD states EBL is approximately 1000cc; however not all items were able to be weighed/QBL calculated due to unaccounted blood that was on floor or items that were left behind in labor room during transport.     0240: CRNA at the bedside for report from OR. Pt requesting yesenia hugge due to being cold. Pt temp 98.0. Yesenia hugger placed on pt by CRNA.

## 2024-04-26 NOTE — LACTATION NOTE
Discussed with mother her plan for feeding.  Reviewed the benefits of exclusive breast milk feeding during the hospital stay.  Informed mother of the risks of using formula to supplement in the first few days of life as well as the benefits of successful breast milk feeding. Mother acknowledges understanding of information reviewed and states that it is her plan to breast milk feed exclusively her infant.  Will support her choice and offer additional information as needed.

## 2024-04-26 NOTE — PROGRESS NOTES
1510:  Bedside and Verbal shift change report given to VARUN Liu RN (oncoming nurse) by AMANDA Buitrago RN (offgoing nurse). Report included the following information Nurse Handoff Report, Adult Overview, MAR, and Recent Results.     1808:  Verbal report given to ALICIA Bruno RN. Care relinquished at this time.

## 2024-04-27 ENCOUNTER — ANESTHESIA EVENT (OUTPATIENT)
Dept: LABOR AND DELIVERY | Facility: HOSPITAL | Age: 39
End: 2024-04-27
Payer: COMMERCIAL

## 2024-04-27 ENCOUNTER — ANESTHESIA (OUTPATIENT)
Dept: LABOR AND DELIVERY | Facility: HOSPITAL | Age: 39
End: 2024-04-27
Payer: COMMERCIAL

## 2024-04-27 LAB
BASOPHILS # BLD: 0 K/UL (ref 0–0.1)
BASOPHILS # BLD: 0 K/UL (ref 0–0.1)
BASOPHILS NFR BLD: 0 % (ref 0–1)
BASOPHILS NFR BLD: 0 % (ref 0–1)
DIFFERENTIAL METHOD BLD: ABNORMAL
DIFFERENTIAL METHOD BLD: ABNORMAL
EOSINOPHIL # BLD: 0 K/UL (ref 0–0.4)
EOSINOPHIL # BLD: 0 K/UL (ref 0–0.4)
EOSINOPHIL NFR BLD: 0 % (ref 0–7)
EOSINOPHIL NFR BLD: 0 % (ref 0–7)
ERYTHROCYTE [DISTWIDTH] IN BLOOD BY AUTOMATED COUNT: 16.4 % (ref 11.5–14.5)
ERYTHROCYTE [DISTWIDTH] IN BLOOD BY AUTOMATED COUNT: 16.4 % (ref 11.5–14.5)
HCT VFR BLD AUTO: 22.4 % (ref 35–47)
HCT VFR BLD AUTO: 23.2 % (ref 35–47)
HCT VFR BLD AUTO: 29.3 % (ref 35–47)
HGB BLD-MCNC: 6.8 G/DL (ref 11.5–16)
HGB BLD-MCNC: 7 G/DL (ref 11.5–16)
HGB BLD-MCNC: 8.5 G/DL (ref 11.5–16)
HISTORY CHECK: NORMAL
IMM GRANULOCYTES # BLD AUTO: 0.3 K/UL (ref 0–0.04)
IMM GRANULOCYTES # BLD AUTO: 0.5 K/UL (ref 0–0.04)
IMM GRANULOCYTES NFR BLD AUTO: 1 % (ref 0–0.5)
IMM GRANULOCYTES NFR BLD AUTO: 2 % (ref 0–0.5)
LYMPHOCYTES # BLD: 1.2 K/UL (ref 0.8–3.5)
LYMPHOCYTES # BLD: 1.9 K/UL (ref 0.8–3.5)
LYMPHOCYTES NFR BLD: 5 % (ref 12–49)
LYMPHOCYTES NFR BLD: 6 % (ref 12–49)
MCH RBC QN AUTO: 19.9 PG (ref 26–34)
MCH RBC QN AUTO: 20.1 PG (ref 26–34)
MCHC RBC AUTO-ENTMCNC: 30.2 G/DL (ref 30–36.5)
MCHC RBC AUTO-ENTMCNC: 30.4 G/DL (ref 30–36.5)
MCV RBC AUTO: 66.1 FL (ref 80–99)
MCV RBC AUTO: 66.1 FL (ref 80–99)
MONOCYTES # BLD: 1.2 K/UL (ref 0–1)
MONOCYTES # BLD: 1.3 K/UL (ref 0–1)
MONOCYTES NFR BLD: 4 % (ref 5–13)
MONOCYTES NFR BLD: 5 % (ref 5–13)
NEUTS SEG # BLD: 28.4 K/UL (ref 1.8–8)
NEUTS SEG NFR BLD: 88 % (ref 32–75)
NEUTS SEG NFR BLD: 89 % (ref 32–75)
NRBC # BLD: 0.06 K/UL (ref 0–0.01)
NRBC # BLD: 0.09 K/UL (ref 0–0.01)
NRBC BLD-RTO: 0.3 PER 100 WBC
NRBC BLD-RTO: 0.3 PER 100 WBC
PLATELET # BLD AUTO: 158 K/UL (ref 150–400)
PLATELET # BLD AUTO: 203 K/UL (ref 150–400)
RBC # BLD AUTO: 3.39 M/UL (ref 3.8–5.2)
RBC # BLD AUTO: 3.51 M/UL (ref 3.8–5.2)
RBC MORPH BLD: ABNORMAL
WBC # BLD AUTO: 23.5 K/UL (ref 3.6–11)
WBC # BLD AUTO: 31.9 K/UL (ref 3.6–11)

## 2024-04-27 PROCEDURE — 36415 COLL VENOUS BLD VENIPUNCTURE: CPT

## 2024-04-27 PROCEDURE — 6370000000 HC RX 637 (ALT 250 FOR IP)

## 2024-04-27 PROCEDURE — 7100000001 HC PACU RECOVERY - ADDTL 15 MIN: Performed by: OBSTETRICS & GYNECOLOGY

## 2024-04-27 PROCEDURE — 2500000003 HC RX 250 WO HCPCS: Performed by: NURSE ANESTHETIST, CERTIFIED REGISTERED

## 2024-04-27 PROCEDURE — 6360000002 HC RX W HCPCS: Performed by: OBSTETRICS & GYNECOLOGY

## 2024-04-27 PROCEDURE — 3700000156 HC EPIDURAL ANESTHESIA

## 2024-04-27 PROCEDURE — 6370000000 HC RX 637 (ALT 250 FOR IP): Performed by: OBSTETRICS & GYNECOLOGY

## 2024-04-27 PROCEDURE — 1120000000 HC RM PRIVATE OB

## 2024-04-27 PROCEDURE — 2580000003 HC RX 258: Performed by: OBSTETRICS & GYNECOLOGY

## 2024-04-27 PROCEDURE — 2580000003 HC RX 258: Performed by: NURSE ANESTHETIST, CERTIFIED REGISTERED

## 2024-04-27 PROCEDURE — 2500000003 HC RX 250 WO HCPCS

## 2024-04-27 PROCEDURE — 10D17ZZ EXTRACTION OF PRODUCTS OF CONCEPTION, RETAINED, VIA NATURAL OR ARTIFICIAL OPENING: ICD-10-PCS | Performed by: OBSTETRICS & GYNECOLOGY

## 2024-04-27 PROCEDURE — 0KQM0ZZ REPAIR PERINEUM MUSCLE, OPEN APPROACH: ICD-10-PCS | Performed by: OBSTETRICS & GYNECOLOGY

## 2024-04-27 PROCEDURE — 88307 TISSUE EXAM BY PATHOLOGIST: CPT

## 2024-04-27 PROCEDURE — 85025 COMPLETE CBC W/AUTO DIFF WBC: CPT

## 2024-04-27 PROCEDURE — 2709999900 HC NON-CHARGEABLE SUPPLY: Performed by: OBSTETRICS & GYNECOLOGY

## 2024-04-27 PROCEDURE — 7100000000 HC PACU RECOVERY - FIRST 15 MIN: Performed by: OBSTETRICS & GYNECOLOGY

## 2024-04-27 PROCEDURE — 7220000101 HC DELIVERY VAGINAL/SINGLE

## 2024-04-27 PROCEDURE — 6360000002 HC RX W HCPCS

## 2024-04-27 PROCEDURE — 6360000002 HC RX W HCPCS: Performed by: NURSE ANESTHETIST, CERTIFIED REGISTERED

## 2024-04-27 PROCEDURE — 7210000100 HC LABOR FEE PER 1 HR

## 2024-04-27 PROCEDURE — 85018 HEMOGLOBIN: CPT

## 2024-04-27 PROCEDURE — 85014 HEMATOCRIT: CPT

## 2024-04-27 RX ORDER — ACETAMINOPHEN 500 MG
1000 TABLET ORAL EVERY 8 HOURS SCHEDULED
Status: DISCONTINUED | OUTPATIENT
Start: 2024-04-27 | End: 2024-04-28 | Stop reason: HOSPADM

## 2024-04-27 RX ORDER — PROCHLORPERAZINE EDISYLATE 5 MG/ML
5 INJECTION INTRAMUSCULAR; INTRAVENOUS
OUTPATIENT
Start: 2024-04-27 | End: 2024-04-28

## 2024-04-27 RX ORDER — SODIUM CHLORIDE 9 MG/ML
INJECTION, SOLUTION INTRAVENOUS PRN
Status: DISCONTINUED | OUTPATIENT
Start: 2024-04-27 | End: 2024-04-28 | Stop reason: HOSPADM

## 2024-04-27 RX ORDER — LIDOCAINE HCL/EPINEPHRINE/PF 2%-1:200K
VIAL (ML) INJECTION PRN
Status: DISCONTINUED | OUTPATIENT
Start: 2024-04-27 | End: 2024-04-27 | Stop reason: SDUPTHER

## 2024-04-27 RX ORDER — SODIUM CHLORIDE 0.9 % (FLUSH) 0.9 %
5-40 SYRINGE (ML) INJECTION EVERY 12 HOURS SCHEDULED
Status: DISCONTINUED | OUTPATIENT
Start: 2024-04-27 | End: 2024-04-28 | Stop reason: HOSPADM

## 2024-04-27 RX ORDER — LANOLIN/MINERAL OIL
LOTION (ML) TOPICAL PRN
Status: DISCONTINUED | OUTPATIENT
Start: 2024-04-27 | End: 2024-04-28 | Stop reason: HOSPADM

## 2024-04-27 RX ORDER — METHYLERGONOVINE MALEATE 0.2 MG/ML
200 INJECTION INTRAVENOUS PRN
Status: DISCONTINUED | OUTPATIENT
Start: 2024-04-27 | End: 2024-04-27

## 2024-04-27 RX ORDER — IBUPROFEN 400 MG/1
800 TABLET ORAL EVERY 8 HOURS SCHEDULED
Status: DISCONTINUED | OUTPATIENT
Start: 2024-04-27 | End: 2024-04-28 | Stop reason: HOSPADM

## 2024-04-27 RX ORDER — ONDANSETRON 2 MG/ML
INJECTION INTRAMUSCULAR; INTRAVENOUS PRN
Status: DISCONTINUED | OUTPATIENT
Start: 2024-04-27 | End: 2024-04-27 | Stop reason: SDUPTHER

## 2024-04-27 RX ORDER — HYDROMORPHONE HYDROCHLORIDE 1 MG/ML
0.25 INJECTION, SOLUTION INTRAMUSCULAR; INTRAVENOUS; SUBCUTANEOUS EVERY 5 MIN PRN
OUTPATIENT
Start: 2024-04-27

## 2024-04-27 RX ORDER — SODIUM CHLORIDE 0.9 % (FLUSH) 0.9 %
5-40 SYRINGE (ML) INJECTION PRN
Status: DISCONTINUED | OUTPATIENT
Start: 2024-04-27 | End: 2024-04-28 | Stop reason: HOSPADM

## 2024-04-27 RX ORDER — PHENYLEPHRINE HCL IN 0.9% NACL 0.4MG/10ML
SYRINGE (ML) INTRAVENOUS PRN
Status: DISCONTINUED | OUTPATIENT
Start: 2024-04-27 | End: 2024-04-27 | Stop reason: SDUPTHER

## 2024-04-27 RX ORDER — SODIUM CHLORIDE, SODIUM LACTATE, POTASSIUM CHLORIDE, CALCIUM CHLORIDE 600; 310; 30; 20 MG/100ML; MG/100ML; MG/100ML; MG/100ML
INJECTION, SOLUTION INTRAVENOUS CONTINUOUS
Status: DISCONTINUED | OUTPATIENT
Start: 2024-04-27 | End: 2024-04-28 | Stop reason: HOSPADM

## 2024-04-27 RX ORDER — SODIUM CHLORIDE 0.9 % (FLUSH) 0.9 %
5-40 SYRINGE (ML) INJECTION PRN
OUTPATIENT
Start: 2024-04-27

## 2024-04-27 RX ORDER — ONDANSETRON 4 MG/1
4 TABLET, ORALLY DISINTEGRATING ORAL EVERY 6 HOURS PRN
Status: DISCONTINUED | OUTPATIENT
Start: 2024-04-27 | End: 2024-04-27

## 2024-04-27 RX ORDER — FERROUS SULFATE 325(65) MG
325 TABLET ORAL 2 TIMES DAILY WITH MEALS
Status: DISCONTINUED | OUTPATIENT
Start: 2024-04-27 | End: 2024-04-28 | Stop reason: HOSPADM

## 2024-04-27 RX ORDER — ONDANSETRON 2 MG/ML
4 INJECTION INTRAMUSCULAR; INTRAVENOUS EVERY 6 HOURS PRN
Status: DISCONTINUED | OUTPATIENT
Start: 2024-04-27 | End: 2024-04-27

## 2024-04-27 RX ORDER — HYDRALAZINE HYDROCHLORIDE 20 MG/ML
10 INJECTION INTRAMUSCULAR; INTRAVENOUS
OUTPATIENT
Start: 2024-04-27

## 2024-04-27 RX ORDER — KETOROLAC TROMETHAMINE 30 MG/ML
INJECTION, SOLUTION INTRAMUSCULAR; INTRAVENOUS PRN
Status: DISCONTINUED | OUTPATIENT
Start: 2024-04-27 | End: 2024-04-27 | Stop reason: SDUPTHER

## 2024-04-27 RX ORDER — OXYTOCIN 10 [USP'U]/ML
INJECTION, SOLUTION INTRAMUSCULAR; INTRAVENOUS
Status: DISCONTINUED
Start: 2024-04-27 | End: 2024-04-27 | Stop reason: WASHOUT

## 2024-04-27 RX ORDER — SODIUM CHLORIDE, SODIUM LACTATE, POTASSIUM CHLORIDE, CALCIUM CHLORIDE 600; 310; 30; 20 MG/100ML; MG/100ML; MG/100ML; MG/100ML
INJECTION, SOLUTION INTRAVENOUS CONTINUOUS PRN
Status: DISCONTINUED | OUTPATIENT
Start: 2024-04-27 | End: 2024-04-27 | Stop reason: SDUPTHER

## 2024-04-27 RX ORDER — CARBOPROST TROMETHAMINE 250 UG/ML
INJECTION, SOLUTION INTRAMUSCULAR
Status: DISPENSED
Start: 2024-04-27 | End: 2024-04-27

## 2024-04-27 RX ORDER — CARBOPROST TROMETHAMINE 250 UG/ML
250 INJECTION, SOLUTION INTRAMUSCULAR PRN
Status: DISCONTINUED | OUTPATIENT
Start: 2024-04-27 | End: 2024-04-27

## 2024-04-27 RX ORDER — ACETAMINOPHEN 325 MG/1
650 TABLET ORAL
OUTPATIENT
Start: 2024-04-27 | End: 2024-04-28

## 2024-04-27 RX ORDER — MISOPROSTOL 200 UG/1
TABLET ORAL
Status: COMPLETED
Start: 2024-04-27 | End: 2024-04-27

## 2024-04-27 RX ORDER — FENTANYL CITRATE 50 UG/ML
INJECTION, SOLUTION INTRAMUSCULAR; INTRAVENOUS PRN
Status: DISCONTINUED | OUTPATIENT
Start: 2024-04-27 | End: 2024-04-27 | Stop reason: SDUPTHER

## 2024-04-27 RX ORDER — OXYCODONE HYDROCHLORIDE 5 MG/1
5 TABLET ORAL EVERY 4 HOURS PRN
Status: DISCONTINUED | OUTPATIENT
Start: 2024-04-27 | End: 2024-04-28 | Stop reason: HOSPADM

## 2024-04-27 RX ORDER — DOCUSATE SODIUM 100 MG/1
100 CAPSULE, LIQUID FILLED ORAL 2 TIMES DAILY
Status: DISCONTINUED | OUTPATIENT
Start: 2024-04-27 | End: 2024-04-28 | Stop reason: HOSPADM

## 2024-04-27 RX ORDER — ONDANSETRON 2 MG/ML
4 INJECTION INTRAMUSCULAR; INTRAVENOUS EVERY 6 HOURS PRN
Status: DISCONTINUED | OUTPATIENT
Start: 2024-04-27 | End: 2024-04-28 | Stop reason: HOSPADM

## 2024-04-27 RX ORDER — FENTANYL CITRATE 50 UG/ML
25 INJECTION, SOLUTION INTRAMUSCULAR; INTRAVENOUS EVERY 5 MIN PRN
OUTPATIENT
Start: 2024-04-27

## 2024-04-27 RX ORDER — DEXAMETHASONE SODIUM PHOSPHATE 4 MG/ML
4 INJECTION, SOLUTION INTRA-ARTICULAR; INTRALESIONAL; INTRAMUSCULAR; INTRAVENOUS; SOFT TISSUE
OUTPATIENT
Start: 2024-04-27 | End: 2024-04-28

## 2024-04-27 RX ORDER — LEVOTHYROXINE SODIUM 0.03 MG/1
50 TABLET ORAL DAILY
Status: DISCONTINUED | OUTPATIENT
Start: 2024-04-27 | End: 2024-04-28 | Stop reason: HOSPADM

## 2024-04-27 RX ORDER — SODIUM CHLORIDE 0.9 % (FLUSH) 0.9 %
5-40 SYRINGE (ML) INJECTION EVERY 12 HOURS SCHEDULED
OUTPATIENT
Start: 2024-04-27

## 2024-04-27 RX ORDER — TRANEXAMIC ACID 100 MG/ML
INJECTION, SOLUTION INTRAVENOUS
Status: COMPLETED
Start: 2024-04-27 | End: 2024-04-27

## 2024-04-27 RX ORDER — DEXAMETHASONE SODIUM PHOSPHATE 4 MG/ML
INJECTION, SOLUTION INTRA-ARTICULAR; INTRALESIONAL; INTRAMUSCULAR; INTRAVENOUS; SOFT TISSUE PRN
Status: DISCONTINUED | OUTPATIENT
Start: 2024-04-27 | End: 2024-04-27 | Stop reason: SDUPTHER

## 2024-04-27 RX ORDER — SODIUM CHLORIDE, SODIUM LACTATE, POTASSIUM CHLORIDE, CALCIUM CHLORIDE 600; 310; 30; 20 MG/100ML; MG/100ML; MG/100ML; MG/100ML
INJECTION, SOLUTION INTRAVENOUS CONTINUOUS
Status: DISCONTINUED | OUTPATIENT
Start: 2024-04-27 | End: 2024-04-27

## 2024-04-27 RX ORDER — METHYLERGONOVINE MALEATE 0.2 MG/ML
INJECTION INTRAVENOUS
Status: COMPLETED
Start: 2024-04-27 | End: 2024-04-27

## 2024-04-27 RX ORDER — OXYCODONE HYDROCHLORIDE 5 MG/1
5 TABLET ORAL
OUTPATIENT
Start: 2024-04-27 | End: 2024-04-28

## 2024-04-27 RX ORDER — OXYCODONE HYDROCHLORIDE 5 MG/1
10 TABLET ORAL EVERY 4 HOURS PRN
Status: DISCONTINUED | OUTPATIENT
Start: 2024-04-27 | End: 2024-04-28 | Stop reason: HOSPADM

## 2024-04-27 RX ORDER — SODIUM CHLORIDE 9 MG/ML
INJECTION, SOLUTION INTRAVENOUS PRN
OUTPATIENT
Start: 2024-04-27

## 2024-04-27 RX ORDER — NALOXONE HYDROCHLORIDE 0.4 MG/ML
INJECTION, SOLUTION INTRAMUSCULAR; INTRAVENOUS; SUBCUTANEOUS PRN
OUTPATIENT
Start: 2024-04-27

## 2024-04-27 RX ORDER — MISOPROSTOL 200 UG/1
400 TABLET ORAL PRN
Status: DISCONTINUED | OUTPATIENT
Start: 2024-04-27 | End: 2024-04-27

## 2024-04-27 RX ORDER — CEFAZOLIN SODIUM 1 G/3ML
INJECTION, POWDER, FOR SOLUTION INTRAMUSCULAR; INTRAVENOUS PRN
Status: DISCONTINUED | OUTPATIENT
Start: 2024-04-27 | End: 2024-04-27 | Stop reason: SDUPTHER

## 2024-04-27 RX ADMIN — ONDANSETRON HYDROCHLORIDE 4 MG: 2 INJECTION, SOLUTION INTRAMUSCULAR; INTRAVENOUS at 02:02

## 2024-04-27 RX ADMIN — SODIUM CHLORIDE, POTASSIUM CHLORIDE, SODIUM LACTATE AND CALCIUM CHLORIDE: 600; 310; 30; 20 INJECTION, SOLUTION INTRAVENOUS at 07:09

## 2024-04-27 RX ADMIN — IBUPROFEN 800 MG: 400 TABLET, FILM COATED ORAL at 14:51

## 2024-04-27 RX ADMIN — LIDOCAINE HYDROCHLORIDE AND EPINEPHRINE 10 ML: 20; 5 INJECTION, SOLUTION EPIDURAL; INFILTRATION; INTRACAUDAL; PERINEURAL at 01:47

## 2024-04-27 RX ADMIN — DEXAMETHASONE SODIUM PHOSPHATE 8 MG: 4 INJECTION, SOLUTION INTRAMUSCULAR; INTRAVENOUS at 02:02

## 2024-04-27 RX ADMIN — MISOPROSTOL 800 MCG: 200 TABLET ORAL at 02:11

## 2024-04-27 RX ADMIN — Medication 80 MCG: at 02:21

## 2024-04-27 RX ADMIN — FENTANYL CITRATE 100 MCG: 50 INJECTION, SOLUTION INTRAMUSCULAR; INTRAVENOUS at 01:47

## 2024-04-27 RX ADMIN — KETOROLAC TROMETHAMINE 15 MG: 30 INJECTION, SOLUTION INTRAMUSCULAR; INTRAVENOUS at 02:22

## 2024-04-27 RX ADMIN — Medication 909 ML/HR: at 01:52

## 2024-04-27 RX ADMIN — Medication 80 MCG: at 01:56

## 2024-04-27 RX ADMIN — TRANEXAMIC ACID 1000 MG: 100 INJECTION, SOLUTION INTRAVENOUS at 01:40

## 2024-04-27 RX ADMIN — WATER 2000 MG: 1 INJECTION INTRAMUSCULAR; INTRAVENOUS; SUBCUTANEOUS at 09:07

## 2024-04-27 RX ADMIN — FERROUS SULFATE TAB 325 MG (65 MG ELEMENTAL FE) 325 MG: 325 (65 FE) TAB at 11:38

## 2024-04-27 RX ADMIN — SODIUM CHLORIDE, POTASSIUM CHLORIDE, SODIUM LACTATE AND CALCIUM CHLORIDE: 600; 310; 30; 20 INJECTION, SOLUTION INTRAVENOUS at 02:31

## 2024-04-27 RX ADMIN — FERROUS SULFATE TAB 325 MG (65 MG ELEMENTAL FE) 325 MG: 325 (65 FE) TAB at 17:21

## 2024-04-27 RX ADMIN — CEFAZOLIN 2 G: 1 INJECTION, POWDER, FOR SOLUTION INTRAMUSCULAR; INTRAVENOUS; PARENTERAL at 02:02

## 2024-04-27 RX ADMIN — Medication 80 MCG: at 02:18

## 2024-04-27 RX ADMIN — ACETAMINOPHEN 1000 MG: 500 TABLET ORAL at 05:14

## 2024-04-27 RX ADMIN — METHYLERGONOVINE MALEATE 200 MCG: 0.2 INJECTION, SOLUTION INTRAMUSCULAR; INTRAVENOUS at 01:59

## 2024-04-27 RX ADMIN — DOCUSATE SODIUM 100 MG: 100 CAPSULE, LIQUID FILLED ORAL at 09:09

## 2024-04-27 RX ADMIN — SODIUM CHLORIDE, POTASSIUM CHLORIDE, SODIUM LACTATE AND CALCIUM CHLORIDE: 600; 310; 30; 20 INJECTION, SOLUTION INTRAVENOUS at 01:47

## 2024-04-27 RX ADMIN — WATER 2000 MG: 1 INJECTION INTRAMUSCULAR; INTRAVENOUS; SUBCUTANEOUS at 17:22

## 2024-04-27 RX ADMIN — ACETAMINOPHEN 1000 MG: 500 TABLET ORAL at 15:55

## 2024-04-27 RX ADMIN — SODIUM CHLORIDE, PRESERVATIVE FREE 10 ML: 5 INJECTION INTRAVENOUS at 09:30

## 2024-04-27 ASSESSMENT — PAIN DESCRIPTION - LOCATION
LOCATION: PERINEUM
LOCATION: ABDOMEN

## 2024-04-27 ASSESSMENT — PAIN - FUNCTIONAL ASSESSMENT: PAIN_FUNCTIONAL_ASSESSMENT: ACTIVITIES ARE NOT PREVENTED

## 2024-04-27 ASSESSMENT — PAIN DESCRIPTION - DESCRIPTORS
DESCRIPTORS: CRAMPING
DESCRIPTORS: SORE

## 2024-04-27 ASSESSMENT — PAIN SCALES - GENERAL
PAINLEVEL_OUTOF10: 0
PAINLEVEL_OUTOF10: 1

## 2024-04-27 NOTE — ANESTHESIA PROCEDURE NOTES
Epidural Block    Patient location during procedure: OB  Start time: 4/26/2024 9:55 PM  End time: 4/26/2024 10:02 PM  Reason for block: labor epidural  Staffing  Performed: anesthesiologist   Anesthesiologist: Norbert Lombardo MD  Performed by: Norbert Lombardo MD  Authorized by: Norbert Lombardo MD    Epidural  Patient position: sitting  Prep: DuraPrep  Patient monitoring: continuous pulse ox and frequent blood pressure checks  Approach: midline  Location: L3-4  Injection technique: ANDRE air  Provider prep: mask and sterile gloves  Needle  Needle type: Tuohy   Needle gauge: 18 G  Needle length: 3.5 in  Catheter type: end hole  Catheter size: 20 G  Test dose: negativeCatheter Secured: tegaderm and tape  Assessment  Sensory level: T8  Hemodynamics: stable  Attempts: 1  Outcomes: uncomplicated and patient tolerated procedure well  Preanesthetic Checklist  Completed: patient identified, IV checked, site marked, risks and benefits discussed, surgical/procedural consents, equipment checked, pre-op evaluation, timeout performed, anesthesia consent given, oxygen available, monitors applied/VS acknowledged, fire risk safety assessment completed and verbalized and blood product R/B/A discussed and consented

## 2024-04-27 NOTE — PROGRESS NOTES
Post-Partum Day Number 0 Progress Note    Jessica Trent is a 37 yo  s/p TSVD on  at 0120 complicated by adherent placenta requiring dilation and curettage in OR. PPH with EBL of at least 920cc. Hgb 8.8>7.0. 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     Assessment: Doing well, post partum day 1    Plan:  - Continue routine postpartum and perineal care as well as maternal education.  - Repeat CBC at 1000 if <7 patient accepts blood transfusion  - Plan discharge home pending clinical course    Acute blood loss anemia:   -2/2 PPH from retained placenta/ adherent placenta   -Hgb 7 this AM, repeat at 1000  -VSS, patient asymptomatic but has not yet ambulated     Gilbert Syndrome:   -Will assess LFTs/ bili in AM     Yolanda Chu MD  Obstetrics and Gynecology   [facility]      Information for the patient's :  Miley, GIRL Jessica [[id number]]   Vaginal, Spontaneous  Patient doing well without significant complaint.  Hayes in place, has not yet ambulated. Pain is well controlled.     Vitals:  /70   Pulse 81   Temp 99.4 °F (37.4 °C) (Oral)   Resp 20   Ht 1.676 m (5' 6\")   Wt 73.5 kg (162 lb)   SpO2 98%   Breastfeeding Unknown   BMI 26.15 kg/m²   Temp (24hrs), Av.7 °F (37.1 °C), Min:98 °F (36.7 °C), Max:99.9 °F (37.7 °C)        Exam:   Patient without distress.                  Fundus firm, nontender per nursing fundal checks.                Perineum with normal lochia noted per nursing assessment.                Lower extremities are negative for pathological edema.    Labs:     Lab Results   Component Value Date/Time    WBC 23.5 2024 04:18 AM    WBC 11.7 2024 12:22 PM    WBC 13.0 2024 09:30 AM    WBC 6.7 2023 09:05 AM    WBC 7.1 2022 02:35 PM    WBC 16.6 2022 11:15 AM    WBC 6.5 2020 08:59 AM    HGB 7.0 2024 04:18 AM    HGB 8.5 2024 02:13 AM    HGB 8.7 2024 12:22 PM    HGB 8.8

## 2024-04-27 NOTE — LACTATION NOTE
This note was copied from a baby's chart.     24 1000   Visit Information   Lactation Consult Visit Type IP Initial Consult   Visit Length 30 minutes   Reason for Visit Education;Normal Slater Visit   Breast Feeding History/Assessment   Left Breast Soft  (Colostrum expressed)   Left Nipple Protrude   Right Nipple Protrude   Right Breast Soft  (Colostrum expressed)   Breastfeeding History Yes  (Mother nursed for 3 mos, then pumped until baby turned one. Good supply)   Feeding Assessment: Maternal Factors   Position and Latch Independently   Right Side Feeding   Infant Latch Observations Rooting;Wide open mouth;Good latch on;Sustained rhythmic suck   Infant Position Cross cradle  (Laid back)   LATCH Documentation   Latch 2   Audible Swallowing 1   Type of Nipple 2   Comfort (Breast/Nipple) 2   Hold (Positioning) 2   LATCH Score 9   Care Plan/Breast Care   Breast Care Using breast pump  (Hand pump)   Lactation Comment Infant is 7 hours of age. Baby is nursing with good latch observed at visit. Provided mother with a Medela hand pump and demonstrated use. Mother has history of breast augmentation. She states her supply was good. She fed first baby EBM for one year.  Equipment: Spectra, mother states she uses 21 mm flanges well with last baby   oral assessment WDL  Educated on pacifier use     Reviewed the \"Your Guide to Breastfeeding\" booklet. Discussed the typical feeding characteristics in the 1st and 2nd DOL and signs of adequate intake. Demonstrated the asymmetric latch and observed baby showing good signs of transfer on the breast. Discussed a feeding plan and mother's questions were addressed.    Plan:  Offer lots of skin to skin and access to the breast.  Feed baby at early signs of hunger every 2-3 hours.  Assure a deep latch, check that baby's lips are turned outward and use breast compression to keep baby actively feeding.  Pump/hand express for poor feeds and offer baby EBM.  Monitor wet and

## 2024-04-27 NOTE — CONSENT
Informed Consent for Blood Component Transfusion Note    I have discussed with the patient the rationale for blood component transfusion; its benefits in treating or preventing fatigue, organ damage, or death; and its risk which includes mild transfusion reactions, rare risk of blood borne infection, or more serious but rare reactions. I have discussed the alternatives to transfusion, including the risk and consequences of not receiving transfusion. The patient had an opportunity to ask questions and had agreed to proceed with transfusion of blood components.    Electronically signed by Damien Patel MD on 4/27/24 at 2:26 AM EDT

## 2024-04-27 NOTE — OP NOTE
D & C OPERATIVE NOTE    Date of Procedure: 2024  Preoperative Diagnosis: Retained placenta after   Postoperative Diagnosis: same  Procedure: Manual extraction postpartum placenta; postpartum curettage    Surgeon: Damien Patel MD  Assistant(s): none   Anesthesia: epidural  Estimated Blood Loss:  600 ml (estimated)   Specimens: placenta  Findings: adherent placenta to the right uterine fundus  Complications: none  Implants: none    DESCRIPTION OF PROCEDURE: The patient was placed on the operating room table in the supine position and epidural analgesia was dosed to achieve surgical anesthesia. Patient was then placed in the dorsal lithotomy position and prepped and draped in the usual fashion for vaginal surgery. Cervix was visualized.  Manual exploration of the uterus revealed the placenta partially  from the left portion of the endometrium and adherent to the right portion of the endometrium.  The placenta was manually removed from the right side without difficulty, but fragmented from the adherent portion.  I was able to digitally remove the adherent portion in multiple fragments.  A large banjo curette was then used to curette the endometrium, and no further retained placental fragments were noted. A further digital exam also revealed no further palpable placental tissue.  Exam of the cervix, vaginal vault reveled no lacerations, hematomas.  The fundus became firm with massage and there was subsequently minimal bleeding noted.  Transvaginal ultrasound showed no further retained tissue.  Observation was continued for an extended period, and bleeding remained minimal, while the fundus continued to palpate firm.  A small second degree laceration that occurred at delivery was repaired with 3-0 Vicryl suture.  A final vaginal exam again revealed no abnormal bleeding, masses, or hematomas.  The procedure was completed, and the patient was returned to her room in stable condition.      LEV Patel MD

## 2024-04-27 NOTE — ANESTHESIA PRE PROCEDURE
neck @ [facility] by Dr. SHONDA Yoder   • TONSILLECTOMY         Social History:    Social History     Tobacco Use   • Smoking status: Never     Passive exposure: Never   • Smokeless tobacco: Never   Substance Use Topics   • Alcohol use: Not Currently                                Counseling given: Not Answered      Vital Signs (Current):   Vitals:    04/26/24 1941 04/26/24 1942 04/26/24 2108 04/26/24 2113   BP:  117/77  135/83   Pulse: 81 78 83 84   Resp:    18   Temp:    98 °F (36.7 °C)   TempSrc:    Oral   SpO2: 98%  100% 100%   Weight:       Height:                                                  BP Readings from Last 3 Encounters:   04/26/24 135/83   04/16/24 116/72   11/30/23 99/60       NPO Status:                                                                                 BMI:   Wt Readings from Last 3 Encounters:   04/26/24 73.5 kg (162 lb)   04/16/24 73.5 kg (162 lb)   11/30/23 63.6 kg (140 lb 3.2 oz)     Body mass index is 26.15 kg/m².    CBC:   Lab Results   Component Value Date/Time    WBC 11.7 04/26/2024 12:22 PM    RBC 4.34 04/26/2024 12:22 PM    HGB 8.7 04/26/2024 12:22 PM    HCT 28.9 04/26/2024 12:22 PM    MCV 66.6 04/26/2024 12:22 PM    RDW 17.1 04/26/2024 12:22 PM     04/26/2024 12:22 PM       CMP:   Lab Results   Component Value Date/Time     04/26/2024 12:22 PM    K 4.1 04/26/2024 12:22 PM     04/26/2024 12:22 PM    CO2 23 04/26/2024 12:22 PM    BUN 9 04/26/2024 12:22 PM    CREATININE 0.72 04/26/2024 12:22 PM    GFRAA >60 09/20/2022 02:35 PM    AGRATIO 0.7 04/26/2024 12:22 PM    AGRATIO 1.6 05/02/2023 09:05 AM    LABGLOM >90 04/26/2024 12:22 PM    GLUCOSE 110 04/26/2024 12:22 PM    PROT 6.7 04/26/2024 12:22 PM    CALCIUM 9.7 04/26/2024 12:22 PM    BILITOT 1.6 04/26/2024 12:22 PM    ALKPHOS 109 04/26/2024 12:22 PM    ALKPHOS 44 05/02/2023 09:05 AM    AST 33 04/26/2024 12:22 PM    ALT 20 04/26/2024 12:22 PM       POC Tests: No results for input(s): \"POCGLU\", \"POCNA\", \"POCK\", \"POCCL\",

## 2024-04-27 NOTE — L&D DELIVERY NOTE
OJ Trent [[id number]]      Labor Events     Labor: No   Steroids: None  Cervical Ripening Date/Time:      Antibiotics Received during Labor: No  Rupture Date/Time:  24 12:30:00   Rupture Type: AROM, Intact  Fluid Color: Clear  Fluid Odor: None  Induction: AROM, Oxytocin  Augmentation: None  Labor Complications: None              Anesthesia    Method: Epidural       Delivery Details      Delivery Date: 24 Delivery Time: 01:20:00   Delivery Type: Vaginal, Spontaneous              [place] Presentation    Presentation: Vertex  Position: Left  _: Occiput  _: Anterior       Shoulder Dystocia    Shoulder Dystocia Present?: No       Assisted Delivery Details    Forceps Attempted?: No  Vacuum Extractor Attempted?: No                           Cord    Vessels: 3 Vessels  Complications: None  Delayed Cord Clamping?: Yes  Gases Sent?: No              Placenta    Date/Time: 2024 03:00:25       Lacerations    Perineal Lacerations: 2nd  Other Lacerations: no non-perineal laceration  Number of Repair Packets: 1       Vaginal Counts    Initial Count Personnel: AMANDA PAZ  Intial Sponge Count: Correct Intial Needles Count: Correct Intial Instruments Count: Correct   Final Sponges Count: Correct Final Needles  Count: Correct    Final Count Personnel: AMANDA PAZ  Final Count Verified By: ALMA MARTIN  Accurate Final Count?: Yes       Blood Loss  Mother: Jessica Trent #[id number]     Start of Mother's Information      Delivery Blood Loss  24 1320 - 24 0300      None                 End of Mother's Information  Mother: Jessica Trent #[id number]                Delivery Providers    Delivering clinician: Damien Patel MD     Provider Role    Damien Patel MD Obstetrician    Columba Dietz RN Primary Nurse    Yarelis Holman RN Primary [place] Nurse     NICU Nurse     Neonatologist     Anesthesiologist     Nurse Anesthetist     Nurse Practitioner     Midwife     Nursery Nurse

## 2024-04-27 NOTE — PROGRESS NOTES
Labor Progress Note  Patient seen, fetal heart rate and contraction pattern evaluated.   AROM @ 1230.  Pt reports gradually increasing ctx over the last hour or so, ready for her epidural.  Leaking clear fluid, no bleeding.  Baby not very active.    Physical Exam:  /77   Pulse 78   Temp 98.5 °F (36.9 °C) (Oral)   Resp 18   Ht 1.676 m (5' 6\")   Wt 73.5 kg (162 lb)   SpO2 98%   BMI 26.15 kg/m²   Cervical Exam: 4/80/-2/mid/soft/vertex  Membranes:  ruptured  Uterine Activity: Frequency: 3 minutes  Fetal Heart Rate: 130s,  adequate variability and reactivity  Accelerations: yes  Decelerations: none    Assessment/Plan:  Reassuring fetal status.   Improved contractions, slow cervical changes.  Planning epidural placement  Continue titration pitocin.    LEV Patel MD

## 2024-04-27 NOTE — ANESTHESIA POSTPROCEDURE EVALUATION
Department of Anesthesiology  Postprocedure Note    Patient: Jessica Trent  MRN: [id number]  YOB: 1985  Date of evaluation: 4/27/2024    Procedure Summary       Date: 04/27/24 Room / Location:     Anesthesia Start: 0147 Anesthesia Stop: 0250    Procedure: Procedure Not Yet Scheduled Diagnosis:     Scheduled Providers:  Responsible Provider: Norbert Lombardo MD    Anesthesia Type: MAC, Epidural ASA Status: 2            Anesthesia Type: MAC, Epidural    Kervin Phase I:      Kervin Phase II:      Anesthesia Post Evaluation    Patient location during evaluation: PACU  Patient participation: complete - patient participated  Level of consciousness: sleepy but conscious and responsive to verbal stimuli  Pain score: 2  Airway patency: patent  Nausea & Vomiting: no vomiting and no nausea  Cardiovascular status: blood pressure returned to baseline and hemodynamically stable  Respiratory status: acceptable  Hydration status: stable  Multimodal analgesia pain management approach  Pain management: adequate    No notable events documented.

## 2024-04-27 NOTE — ANESTHESIA POSTPROCEDURE EVALUATION
Department of Anesthesiology  Postprocedure Note    Patient: Jessica Trent  MRN: [id number]  YOB: 1985  Date of evaluation: 4/27/2024    Procedure Summary       Date: 04/26/24 Room / Location:     Anesthesia Start: 2155 Anesthesia Stop: 04/27/24 0120    Procedure: Labor Analgesia Diagnosis:     Scheduled Providers:  Responsible Provider: Norbert Lombardo MD    Anesthesia Type: epidural ASA Status: 2            Anesthesia Type: No value filed.    Kervin Phase I:      Kervin Phase II:      Anesthesia Post Evaluation    Patient location during evaluation: PACU  Patient participation: complete - patient participated  Level of consciousness: sleepy but conscious and responsive to verbal stimuli  Pain score: 1  Airway patency: patent  Nausea & Vomiting: no vomiting and no nausea  Cardiovascular status: blood pressure returned to baseline and hemodynamically stable  Respiratory status: acceptable  Hydration status: stable  Multimodal analgesia pain management approach  Pain management: adequate    No notable events documented.

## 2024-04-27 NOTE — ANESTHESIA PRE PROCEDURE
Department of Anesthesiology  Preprocedure Note       Name:  Jessica Trent   Age:  38 y.o.  :  1985                                          MRN:  [id number]         Date:  2024      Surgeon: * No surgeons listed *    Procedure: * No procedures listed *    Medications prior to admission:   Prior to Admission medications    Medication Sig Start Date End Date Taking? Authorizing Provider   Prenatal MV-Min-Fe Fum-FA-DHA (PRENATAL 1 PO) Take by mouth daily    ProviderMervin MD   levothyroxine (SYNTHROID) 50 MCG tablet TAKE 1 TABLET BY MOUTH ONE TIME A DAY BEFORE BREAKFAST 1/15/24   Florence Ramsey MD   aspirin 81 MG chewable tablet Take 1 tablet by mouth daily    Mervin Roberto MD   SUMAtriptan (IMITREX) 100 MG tablet Take 1 tablet by mouth daily as needed for Migraine  Patient not taking: Reported on 23   Florence Ramsey MD   magnesium Oxide 500 MG TABS Take by mouth  Patient not taking: Reported on 2023    Automatic Reconciliation, Ar   triamcinolone (KENALOG) 0.1 % ointment ceived the following from [facility]: Outside name: triamcinolone acetonide (KENALOG) 0.1 % ointment  Patient not taking: Reported on 2023   Automatic Reconciliation, Ar       Current medications:    Current Facility-Administered Medications   Medication Dose Route Frequency Provider Last Rate Last Admin   • tranexamic acid (CYKLOKAPRON) 1000 MG/10ML injection            • oxytocin (PITOCIN) 10 UNIT/ML injection            • miSOPROStol (CYTOTEC) 200 MCG tablet            • methylergonovine (METHERGINE) 0.2 MG/ML injection            • carboprost (HEMABATE) 250 MCG/ML injection            • 0.9 % sodium chloride infusion   IntraVENous PRN Damien Patel MD       • oxytocin (PITOCIN) 30 units in 500 mL infusion  1-25 chiara-units/min IntraVENous Continuous Damien Patel  mL/hr at 24 0158 909 mL/hr at 248   • terbutaline (BRETHINE)

## 2024-04-28 VITALS
SYSTOLIC BLOOD PRESSURE: 111 MMHG | HEIGHT: 66 IN | BODY MASS INDEX: 26.03 KG/M2 | TEMPERATURE: 98.4 F | HEART RATE: 82 BPM | WEIGHT: 162 LBS | DIASTOLIC BLOOD PRESSURE: 70 MMHG | OXYGEN SATURATION: 100 % | RESPIRATION RATE: 16 BRPM

## 2024-04-28 LAB
ALBUMIN SERPL-MCNC: 2.1 G/DL (ref 3.5–5)
ALBUMIN/GLOB SERPL: 0.7 (ref 1.1–2.2)
ALP SERPL-CCNC: 84 U/L (ref 45–117)
ALT SERPL-CCNC: 14 U/L (ref 12–78)
ANION GAP SERPL CALC-SCNC: 6 MMOL/L (ref 5–15)
AST SERPL-CCNC: 25 U/L (ref 15–37)
BASOPHILS # BLD: 0.1 K/UL (ref 0–0.1)
BASOPHILS NFR BLD: 1 % (ref 0–1)
BILIRUB SERPL-MCNC: 0.9 MG/DL (ref 0.2–1)
BUN SERPL-MCNC: 12 MG/DL (ref 6–20)
BUN/CREAT SERPL: 16 (ref 12–20)
CALCIUM SERPL-MCNC: 8.5 MG/DL (ref 8.5–10.1)
CHLORIDE SERPL-SCNC: 111 MMOL/L (ref 97–108)
CO2 SERPL-SCNC: 23 MMOL/L (ref 21–32)
CREAT SERPL-MCNC: 0.73 MG/DL (ref 0.55–1.02)
DIFFERENTIAL METHOD BLD: ABNORMAL
EOSINOPHIL # BLD: 0.1 K/UL (ref 0–0.4)
EOSINOPHIL NFR BLD: 1 % (ref 0–7)
ERYTHROCYTE [DISTWIDTH] IN BLOOD BY AUTOMATED COUNT: 16.9 % (ref 11.5–14.5)
GLOBULIN SER CALC-MCNC: 3 G/DL (ref 2–4)
GLUCOSE SERPL-MCNC: 85 MG/DL (ref 65–100)
HCT VFR BLD AUTO: 20.1 % (ref 35–47)
HGB BLD-MCNC: 6 G/DL (ref 11.5–16)
IMM GRANULOCYTES # BLD AUTO: 0.4 K/UL (ref 0–0.04)
IMM GRANULOCYTES NFR BLD AUTO: 2 % (ref 0–0.5)
LYMPHOCYTES # BLD: 3.9 K/UL (ref 0.8–3.5)
LYMPHOCYTES NFR BLD: 21 % (ref 12–49)
MCH RBC QN AUTO: 20.2 PG (ref 26–34)
MCHC RBC AUTO-ENTMCNC: 29.9 G/DL (ref 30–36.5)
MCV RBC AUTO: 67.7 FL (ref 80–99)
MONOCYTES # BLD: 1 K/UL (ref 0–1)
MONOCYTES NFR BLD: 5 % (ref 5–13)
NEUTS SEG # BLD: 13.4 K/UL (ref 1.8–8)
NEUTS SEG NFR BLD: 71 % (ref 32–75)
NRBC # BLD: 0.07 K/UL (ref 0–0.01)
NRBC BLD-RTO: 0.4 PER 100 WBC
PLATELET # BLD AUTO: 169 K/UL (ref 150–400)
POTASSIUM SERPL-SCNC: 4.1 MMOL/L (ref 3.5–5.1)
PROT SERPL-MCNC: 5.1 G/DL (ref 6.4–8.2)
RBC # BLD AUTO: 2.97 M/UL (ref 3.8–5.2)
SODIUM SERPL-SCNC: 140 MMOL/L (ref 136–145)
WBC # BLD AUTO: 18.9 K/UL (ref 3.6–11)

## 2024-04-28 PROCEDURE — 36415 COLL VENOUS BLD VENIPUNCTURE: CPT

## 2024-04-28 PROCEDURE — 30233N1 TRANSFUSION OF NONAUTOLOGOUS RED BLOOD CELLS INTO PERIPHERAL VEIN, PERCUTANEOUS APPROACH: ICD-10-PCS | Performed by: OBSTETRICS & GYNECOLOGY

## 2024-04-28 PROCEDURE — 85025 COMPLETE CBC W/AUTO DIFF WBC: CPT

## 2024-04-28 PROCEDURE — P9016 RBC LEUKOCYTES REDUCED: HCPCS

## 2024-04-28 PROCEDURE — 80053 COMPREHEN METABOLIC PANEL: CPT

## 2024-04-28 PROCEDURE — 6370000000 HC RX 637 (ALT 250 FOR IP): Performed by: STUDENT IN AN ORGANIZED HEALTH CARE EDUCATION/TRAINING PROGRAM

## 2024-04-28 PROCEDURE — 6370000000 HC RX 637 (ALT 250 FOR IP): Performed by: OBSTETRICS & GYNECOLOGY

## 2024-04-28 PROCEDURE — 36430 TRANSFUSION BLD/BLD COMPNT: CPT

## 2024-04-28 RX ORDER — SODIUM CHLORIDE 9 MG/ML
INJECTION, SOLUTION INTRAVENOUS PRN
Status: DISCONTINUED | OUTPATIENT
Start: 2024-04-28 | End: 2024-04-28 | Stop reason: HOSPADM

## 2024-04-28 RX ADMIN — LEVOTHYROXINE SODIUM 50 MCG: 0.03 TABLET ORAL at 08:44

## 2024-04-28 RX ADMIN — ACETAMINOPHEN 1000 MG: 500 TABLET ORAL at 08:58

## 2024-04-28 RX ADMIN — DOCUSATE SODIUM 100 MG: 100 CAPSULE, LIQUID FILLED ORAL at 08:45

## 2024-04-28 RX ADMIN — FERROUS SULFATE TAB 325 MG (65 MG ELEMENTAL FE) 325 MG: 325 (65 FE) TAB at 08:45

## 2024-04-28 ASSESSMENT — PAIN - FUNCTIONAL ASSESSMENT
PAIN_FUNCTIONAL_ASSESSMENT: ACTIVITIES ARE NOT PREVENTED
PAIN_FUNCTIONAL_ASSESSMENT: ACTIVITIES ARE NOT PREVENTED

## 2024-04-28 ASSESSMENT — PAIN DESCRIPTION - ORIENTATION: ORIENTATION: LOWER

## 2024-04-28 ASSESSMENT — PAIN DESCRIPTION - LOCATION: LOCATION: ABDOMEN

## 2024-04-28 ASSESSMENT — PAIN DESCRIPTION - DESCRIPTORS: DESCRIPTORS: CRAMPING

## 2024-04-28 ASSESSMENT — PAIN SCALES - GENERAL: PAINLEVEL_OUTOF10: 3

## 2024-04-28 NOTE — PROGRESS NOTES
Post-Partum Day Number 1 Progress Note    Jessica Trent is a 39 yo  s/p TSVD on  at 0120 complicated by adherent placenta requiring dilation and curettage in OR. PPH with EBL of at least 920cc. Hgb 8.8>7.0. 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     Assessment: Doing well, post partum day 1    Plan:  - Continue routine postpartum and perineal care as well as maternal education  - Plan discharge home  today if blood transfusion is tolerated well     Acute blood loss anemia:   -2/2 PPH from retained placenta/ adherent placenta   - Hgb 6.0 this morning, patient with fatigue   -Patient agrees with recommendation for pRBC transfusion x1  -Ordered for this AM  -If patient tolerates transfusion will DC home and follow up in 1 week in the office     Gilbert Syndrome:   -CMP with normal bili today     Yolanda Chu MD  Obstetrics and Gynecology   [facility]      Information for the patient's :  iMley, GIRL Jessica [[id number]]   Vaginal, Spontaneous  Patient doing well without significant complaint.  Voiding, ambulating and tolerating PO.   Vitals:  /71   Pulse 88   Temp 98.1 °F (36.7 °C) (Oral)   Resp 16   Ht 1.676 m (5' 6\")   Wt 73.5 kg (162 lb)   SpO2 99%   Breastfeeding Unknown   BMI 26.15 kg/m²   Temp (24hrs), Av.1 °F (36.7 °C), Min:97.4 °F (36.3 °C), Max:98.6 °F (37 °C)        Exam:   Patient without distress.                  Fundus firm, nontender per nursing fundal checks.                Perineum with normal lochia noted per nursing assessment.                Lower extremities are negative for pathological edema.    Labs:     Lab Results   Component Value Date/Time    WBC 18.9 2024 07:37 AM    WBC 31.9 2024 10:27 AM    WBC 23.5 2024 04:18 AM    WBC 11.7 2024 12:22 PM    WBC 13.0 2024 09:30 AM    WBC 6.7 2023 09:05 AM    WBC 7.1 2022 02:35 PM    WBC 16.6 2022 11:15 AM    WBC 6.5

## 2024-04-28 NOTE — FLOWSHEET NOTE
1115 Unit of PRBC's started at this time as ordered. Verified with PARK Duffy RN.     1426 Unit of PRBC's completed at this time. No adverse reactions noted. Dr. Hunt ordered patient may be discharged one hour after completion of transfusion. Stated patient did not need an H&H lab drawn prior to discharge.

## 2024-04-28 NOTE — DISCHARGE SUMMARY
Obstetrical Discharge Summary     Name: Jessica Trent MRN: [id number]  SSN: [id number]    YOB: 1985  Age: 38 y.o.  Sex: female      Admit Date: 2024    Discharge Date: 2024     Admitting Physician: Amber Medina MD     Attending Physician:  Amber Medina MD     Admission Diagnoses: Gilbert syndrome [E80.4]    Discharge Diagnoses:   Information for the patient's :  Miley, OJ Lopez [[id number]]   @[phone]@      Additional Diagnoses:  No components found for: \"OBEXTABORH\", \"OBEXTABSCRN\", \"OBEXTRUBELLA\", \"OBEXTGRBS\"    Hospital Course: 37 yo  admitted for IOL for dec FM at term. Underwent TSVD. This was complicated by retained/ adherent placenta requiring post partum D&C. EBL was approx 920cc. Hemoglobin at 6 on PPD1 so decision made to transfuse 1u of pRCBS. Patient was discharged home on PPD1 with close follow up.     Patient Instructions:   Current Discharge Medication List        CONTINUE these medications which have NOT CHANGED    Details   Prenatal MV-Min-Fe Fum-FA-DHA (PRENATAL 1 PO) Take by mouth daily      levothyroxine (SYNTHROID) 50 MCG tablet TAKE 1 TABLET BY MOUTH ONE TIME A DAY BEFORE BREAKFAST  Qty: 90 tablet, Refills: 1           STOP taking these medications       aspirin 81 MG chewable tablet Comments:   Reason for Stopping:         SUMAtriptan (IMITREX) 100 MG tablet Comments:   Reason for Stopping:         magnesium Oxide 500 MG TABS Comments:   Reason for Stopping:         triamcinolone (KENALOG) 0.1 % ointment Comments:   Reason for Stopping:               Disposition at Discharge: Home or self care    Condition at Discharge: Stable    Reference my discharge instructions.    No follow-ups on file.     Signed By:  Yolanda Chu MD     2024

## 2024-04-28 NOTE — FLOWSHEET NOTE
1530 Patient discharged at this time. Discharge instructions provided. Plans to follow up with OBGYN on Thursday as instructed. Patient denies any questions or concerns at this time. Vitals stable post transfusion.

## 2024-04-29 ENCOUNTER — CARE COORDINATION (OUTPATIENT)
Dept: OTHER | Facility: CLINIC | Age: 39
End: 2024-04-29

## 2024-04-29 LAB
ABO + RH BLD: NORMAL
BLD PROD TYP BPU: NORMAL
BLD PROD TYP BPU: NORMAL
BLOOD BANK BLOOD PRODUCT EXPIRATION DATE: NORMAL
BLOOD BANK DISPENSE STATUS: NORMAL
BLOOD BANK DISPENSE STATUS: NORMAL
BLOOD BANK ISBT PRODUCT BLOOD TYPE: 6200
BLOOD BANK UNIT TYPE AND RH: NORMAL
BLOOD GROUP ANTIBODIES SERPL: NORMAL
BPU ID: NORMAL
BPU ID: NORMAL
CROSSMATCH RESULT: NORMAL
CROSSMATCH RESULT: NORMAL
SPECIMEN EXP DATE BLD: NORMAL
UNIT DIVISION: 0
UNIT DIVISION: 0
UNIT ISSUE DATE/TIME: NORMAL

## 2024-04-29 NOTE — CARE COORDINATION
Patient eligible for [facility] Maternity Management Program    Maternity Care Manager contacted the patient by telephone to discuss the maternity management program.  Patient agrees to care management services at this time. Verified name and  with patient as identifiers.   Patient Current Location: [place]      Call within 2 business days of discharge: Yes     Last Discharge Facility       Date Complaint Diagnosis Description Type Department Provider    24 Scheduled Induction  Admission (Discharged) [id number] Amber Medina MD       Risk Factors Identified:  multip 2nd baby      Needs to be reviewed by the provider   none         Method of communication with provider : none    Advance Care Planning:   Does patient have an Advance Directive:  not on file.     Does patient have OB/Gyn Selected? Yes    Discussed follow up appointments. If no appointment was previously scheduled, appointment scheduling offered: Yes  [facility] follow up appointment(s): No future appointments.  Non-[facility] follow up appointment(s): 24    OB History:   OB History    Para Term  AB Living   3 2 2 0 1 2   SAB IAB Ectopic Molar Multiple Live Births   1 0 0 0 0 2      # Outcome Date GA Lbr Tee/2nd Weight Sex Delivery Anes PTL Lv   3 Term 24 39w2d  3.475 kg (7 lb 10.6 oz) F Vag-Spont EPI N STEPHENIE   2 SAB 2020 8w0d   U SAB      1 Term                39w2d    Medication reconciliation was performed with patient, who verbalizes understanding of administration of home medications. Advised obtaining a 90-day supply of all daily and as-needed medications.     Barriers/Support system:  mother and spouse  Return to work planning? Yes      Postpartum Assessment:  Vaginal  Lochia-normal  Fever No  Perineal care-yes small tear no repair  Visual changes No  Calf pain No  Headache No  Breast Pain No  Depression or feelings of sadness or overwhelm-none  Breast Feeding Yes  Feeding schedule-q30 min-2h  Sleep

## 2024-05-09 ENCOUNTER — TELEPHONE (OUTPATIENT)
Facility: HOSPITAL | Age: 39
End: 2024-05-09

## 2024-05-09 NOTE — TELEPHONE ENCOUNTER
Mother contacted for follow-up breastfeeding check. Mother had difficulty with engorgement and was pumping frequently and leading to an oversupply. She is now pumping only if she feels uncomfortale. States that the latch is good and baby is breastfeeding and some bottle feeding EBM.  Mother provided with 's contact information if assistance is needed in the future.

## 2024-05-14 ENCOUNTER — CARE COORDINATION (OUTPATIENT)
Dept: OTHER | Facility: CLINIC | Age: 39
End: 2024-05-14

## 2024-05-14 NOTE — CARE COORDINATION
Call within 2 business days of discharge: Yes     Patient Current Location: [place]    Last Discharge Facility       Date Complaint Diagnosis Description Type Department Provider    24 Scheduled Induction  Admission (Discharged) [id number] Terry MD       Maternity Care Manager contacted the patient by telephone to discuss the maternity management program.  Patient agrees to care management services at this time. Verified name and  with patient as identifiers.     Risk Factors Identified:  first baby      Needs to be reviewed by the provider   none         Method of communication with provider : none    Advance Care Planning:   Does patient have an Advance Directive:  not on file.     Does patient have OB/Gyn Selected? Yes    Discussed follow up appointments. If no appointment was previously scheduled, appointment scheduling offered: Yes  [facility] follow up appointment(s): No future appointments.  Non-BS follow up appointment(s): 6/10/24    OB History:   OB History    Para Term  AB Living   3 2 2 0 1 2   SAB IAB Ectopic Molar Multiple Live Births   1 0 0 0 0 2      # Outcome Date GA Lbr Tee/2nd Weight Sex Delivery Anes PTL Lv   3 Term 24 39w2d  3.475 kg (7 lb 10.6 oz) F Vag-Spont EPI N STEPHENIE   2 SAB 2020 8w0d   U SAB      1 Term                39w2d      Postpartum Assessment: vag delivery 24  Vaginal  Lochia-normal  Breast Feeding Yes  Infant's weight  S/w pt today she and the baby are doing well 2wpp. There are no concerns. Pt has her 6w f/u appointment set up. We discussed moms milk supply and possible over production and some methods to help prevent the engorgement. Will continue to follow.    Patient given an opportunity to ask questions and does not have any further questions or concerns at this time. Were discharge instructions available to patient? Yes. Reviewed appropriate site of care based on symptoms and resources available to patient including: Benefits

## 2024-06-26 ENCOUNTER — CARE COORDINATION (OUTPATIENT)
Dept: OTHER | Facility: CLINIC | Age: 39
End: 2024-06-26

## 2024-06-26 NOTE — CARE COORDINATION
Call within 2 business days of discharge: Yes     Patient Current Location: [place]    Last Discharge Facility       Date Complaint Diagnosis Description Type Department Provider    24 Scheduled Induction  Admission (Discharged) [id number] Terry MD       Maternity Care Manager contacted the patient by telephone to discuss the maternity management program.  Patient agrees to care management services at this time. Verified name and  with patient as identifiers.     Risk Factors Identified:  liver disease/hypothyroidism multip      Needs to be reviewed by the provider   none         Method of communication with provider : none    Advance Care Planning:   Does patient have an Advance Directive:  not on file.     Does patient have OB/Gyn Selected? Yes    Discussed follow up appointments. If no appointment was previously scheduled, appointment scheduling offered: Yes  BS follow up appointment(s): No future appointments.  Non-BS follow up appointment(s):     OB History:   OB History    Para Term  AB Living   3 2 2 0 1 2   SAB IAB Ectopic Molar Multiple Live Births   1 0 0 0 0 2      # Outcome Date GA Lbr Tee/2nd Weight Sex Delivery Anes PTL Lv   3 Term 24 39w2d  3.475 kg (7 lb 10.6 oz) F Vag-Spont EPI N STEPHENIE   2 SAB 2020 8w0d   U SAB      1 Term                Unknown    Postpartum Assessment:8w pp  S/w pt today she is doing well she is now 8w pp. Her 6w pp visit went well and she was released back to regular activities. The pt is still breast feeding with no supply concerns currently. The baby is gaining weight well with no concerns.      Resolving current program.  No further ED/UC or hospital admissions within 30 days post discharge. Patient attended follow-up appointments as directed.  No outreach from patient to ECMT.

## 2024-07-15 RX ORDER — LEVOTHYROXINE SODIUM 0.05 MG/1
TABLET ORAL
Qty: 90 TABLET | Refills: 1 | Status: SHIPPED | OUTPATIENT
Start: 2024-07-15

## 2024-08-14 ENCOUNTER — PATIENT MESSAGE (OUTPATIENT)
Age: 39
End: 2024-08-14

## 2024-08-16 RX ORDER — SUMATRIPTAN 100 MG/1
100 TABLET, FILM COATED ORAL PRN
Qty: 9 TABLET | Refills: 0 | Status: SHIPPED | OUTPATIENT
Start: 2024-08-16

## 2024-08-16 NOTE — TELEPHONE ENCOUNTER
Pt's OBGYN orginally prescribed medication but has recently d/c medication.    Please advise if you will refill/ prescibe medication for pt.

## 2024-09-26 RX ORDER — SUMATRIPTAN 100 MG/1
100 TABLET, FILM COATED ORAL PRN
Qty: 9 TABLET | Refills: 0 | OUTPATIENT
Start: 2024-09-26

## 2024-09-26 RX ORDER — SUMATRIPTAN 100 MG/1
TABLET, FILM COATED ORAL
Qty: 9 TABLET | Refills: 0 | Status: SHIPPED | OUTPATIENT
Start: 2024-09-26

## 2024-12-05 ENCOUNTER — OFFICE VISIT (OUTPATIENT)
Age: 39
End: 2024-12-05
Payer: COMMERCIAL

## 2024-12-05 VITALS
HEART RATE: 79 BPM | OXYGEN SATURATION: 98 % | HEIGHT: 66 IN | DIASTOLIC BLOOD PRESSURE: 68 MMHG | SYSTOLIC BLOOD PRESSURE: 103 MMHG | RESPIRATION RATE: 18 BRPM | TEMPERATURE: 97.2 F | BODY MASS INDEX: 23.46 KG/M2 | WEIGHT: 146 LBS

## 2024-12-05 DIAGNOSIS — G43.009 MIGRAINE WITHOUT AURA AND WITHOUT STATUS MIGRAINOSUS, NOT INTRACTABLE: ICD-10-CM

## 2024-12-05 DIAGNOSIS — E06.3 HYPOTHYROIDISM DUE TO HASHIMOTO'S THYROIDITIS: Primary | ICD-10-CM

## 2024-12-05 DIAGNOSIS — C43.4 MALIGNANT MELANOMA OF SCALP AND NECK (HCC): ICD-10-CM

## 2024-12-05 PROCEDURE — 99214 OFFICE O/P EST MOD 30 MIN: CPT | Performed by: INTERNAL MEDICINE

## 2024-12-05 RX ORDER — SUMATRIPTAN SUCCINATE 100 MG/1
100 TABLET ORAL DAILY PRN
Qty: 9 TABLET | Refills: 0 | Status: SHIPPED | OUTPATIENT
Start: 2024-12-05

## 2024-12-05 NOTE — PROGRESS NOTES
\"Have you been to the ER, urgent care clinic since your last visit?  Hospitalized since your last visit?\"    NO    “Have you seen or consulted any other health care providers outside our system since your last visit?”    NO     “Have you had a pap smear?”    Yes, Denise De Santiago    No cervical cancer screening on file

## 2024-12-05 NOTE — PROGRESS NOTES
Jessica Trent (:  1985) is a 39 y.o. female, Established patient, here for evaluation of the following chief complaint(s):  Annual Exam         Assessment & Plan  1. Migraine.  A prescription for Imitrex will be provided. She is advised to consider over-the-counter magnesium and B vitamins, as well as a nightly dose of 3 mg melatonin, to potentially reduce the frequency of her migraines. Consistent use of these supplements for a month is recommended to assess their effectiveness. Preventative medications such as Topamax, propranolol, and Elavil were discussed, but she prefers not to start them at this time due to potential side effects.    2. Thalassemia Anemia.  Her white blood cell count was elevated during her pregnancy, and she was also anemic. Her MCV is low, which could be indicative of thalassemia. She reports feeling okay currently. No additional treatment is required at this time.    3. Thyroid function.  Thyroid studies will be conducted today to ensure her thyroid levels are stable, as it has been six months since her last check. She is currently taking 50 mcg of thyroid medication.        Results  Laboratory Studies  White blood cell count was high during pregnancy. Hemoglobin was 6.7 in . MCV is 66.  1. Hypothyroidism due to Hashimoto's thyroiditis  -     TSH; Future  -     T4, Free; Future  2. Migraine without aura and without status migrainosus, not intractable  -     SUMAtriptan (IMITREX) 100 MG tablet; Take 1 tablet by mouth daily as needed for Migraine, Disp-9 tablet, R-0Normal  3. Malignant melanoma of scalp and neck (HCC)    Return in about 6 months (around 2025).       Subjective   History of Present Illness  The patient presents for evaluation of multiple medical concerns.    She reports that her migraines have returned to their pre-pregnancy state. During her pregnancy, she experienced migraines in the first trimester, but they subsided during the second and third

## 2024-12-06 LAB
T4 FREE SERPL-MCNC: 1.1 NG/DL (ref 0.8–1.5)
TSH SERPL DL<=0.05 MIU/L-ACNC: 0.71 UIU/ML (ref 0.36–3.74)

## 2025-01-20 RX ORDER — LEVOTHYROXINE SODIUM 50 UG/1
TABLET ORAL
Qty: 90 TABLET | Refills: 0 | Status: SHIPPED | OUTPATIENT
Start: 2025-01-20

## 2025-04-14 RX ORDER — LEVOTHYROXINE SODIUM 50 UG/1
TABLET ORAL
Qty: 90 TABLET | Refills: 0 | Status: SHIPPED | OUTPATIENT
Start: 2025-04-14

## 2025-07-15 RX ORDER — LEVOTHYROXINE SODIUM 50 UG/1
TABLET ORAL
Qty: 90 TABLET | Refills: 0 | Status: SHIPPED | OUTPATIENT
Start: 2025-07-15

## (undated) DEVICE — PACK,EENT,TURBAN DRAPE,PK II: Brand: MEDLINE

## (undated) DEVICE — STERILE POLYISOPRENE POWDER-FREE SURGICAL GLOVES WITH EMOLLIENT COATING: Brand: PROTEXIS

## (undated) DEVICE — SYR 10ML LUER LOK 1/5ML GRAD --

## (undated) DEVICE — SUTURE PERMAHAND SZ 2-0 L18IN NONABSORBABLE BLK L26MM PS 1588H

## (undated) DEVICE — DRAPE,UTILTY,TAPE,15X26, 4EA/PK: Brand: MEDLINE

## (undated) DEVICE — SOLUTION SCRB 4OZ 4% CHG H2O AIDED FOR PREOPERATIVE SKIN

## (undated) DEVICE — NEEDLE HYPO 25GA L1.5IN BVL ORIENTED ECLIPSE

## (undated) DEVICE — PACK PROCEDURE SURG VAG DEL LF

## (undated) DEVICE — SUTURE VCRL SZ 3-0 L27IN ABSRB UD L26MM SH 1/2 CIR J416H

## (undated) DEVICE — HANDLE LT SNAP ON ULT DURABLE LENS FOR TRUMPF ALC DISPOSABLE

## (undated) DEVICE — GARMENT,MEDLINE,DVT,INT,CALF,MED, GEN2: Brand: MEDLINE

## (undated) DEVICE — COVER LT HNDL FLX

## (undated) DEVICE — GOWN,AURORA,FABRIC-REINFORCED,X-LARGE: Brand: MEDLINE

## (undated) DEVICE — REM POLYHESIVE ADULT PATIENT RETURN ELECTRODE: Brand: VALLEYLAB

## (undated) DEVICE — TRAY PREP DRY W/ PREM GLV 2 APPL 6 SPNG 2 UNDPD 1 OVERWRAP

## (undated) DEVICE — COVER US PRB W5XL96IN LTX W/ GEL

## (undated) DEVICE — Device

## (undated) DEVICE — SUTURE MCRYL SZ 4-0 L27IN ABSRB UD L19MM PS-2 1/2 CIR PRIM Y426H

## (undated) DEVICE — COVER US PRB W12XL244CM FLD IORT STR TIP

## (undated) DEVICE — X-RAY DETECTABLE SPONGES,16 PLY: Brand: VISTEC

## (undated) DEVICE — DERMABOND SKIN ADH 0.7ML -- DERMABOND ADVANCED 12/BX